# Patient Record
Sex: FEMALE | Race: WHITE | NOT HISPANIC OR LATINO | Employment: FULL TIME | URBAN - METROPOLITAN AREA
[De-identification: names, ages, dates, MRNs, and addresses within clinical notes are randomized per-mention and may not be internally consistent; named-entity substitution may affect disease eponyms.]

---

## 2017-09-23 ENCOUNTER — GENERIC CONVERSION - ENCOUNTER (OUTPATIENT)
Dept: OTHER | Facility: OTHER | Age: 42
End: 2017-09-23

## 2018-01-17 NOTE — PROGRESS NOTES
Assessment    1  Acute sinusitis (461 9) (J01 90)    Plan  Acute sinusitis    · Amoxicillin-Pot Clavulanate 875-125 MG Oral Tablet (Augmentin); TAKE 1 TABLET  TWICE DAILY UNTIL FINISHED    Discussion/Summary    Sinusitis of frontal region  Abx given, start claritin D along with and f/u with PCP in 7 days if not better or worse  Possible side effects of new medications were reviewed with the patient/guardian today  The treatment plan was reviewed with the patient/guardian  The patient/guardian understands and agrees with the treatment plan      Chief Complaint  Sinus infection      History of Present Illness  The patient is being seen for an initial evaluation of sinusitis  The sinusitis involves the frontal sinuses  The patient is currently experiencing symptoms  no facial pain facial pressure headache no retro-orbital pain no dental pain nasal congestion purulent rhinorrhea postnasal drainage   Associated symptoms:  chills and cough, but no fever, no nausea, no ear fullness, no ear pain, no ear pressure, no diplopia, no periorbital redness, no periorbital swelling, no neck stiffness and no sore throat  Review of Systems    Constitutional: as noted in HPI  Gastrointestinal: no complaints of abdominal pain, no constipation, no nausea or diarrhea, no vomiting, no bloody stools  Allergies    1  No Known Drug Allergies    Observations Made  No distress but occ intermittent cough        Signatures   Electronically signed by : BRENDEN Frederick ; Sep 23 2017 10:22AM EST                       (Author)

## 2018-12-11 ENCOUNTER — TRANSCRIBE ORDERS (OUTPATIENT)
Dept: ADMINISTRATIVE | Facility: HOSPITAL | Age: 43
End: 2018-12-11

## 2018-12-11 DIAGNOSIS — Z12.31 VISIT FOR SCREENING MAMMOGRAM: Primary | ICD-10-CM

## 2019-01-14 ENCOUNTER — HOSPITAL ENCOUNTER (OUTPATIENT)
Dept: RADIOLOGY | Age: 44
Discharge: HOME | End: 2019-01-14
Payer: COMMERCIAL

## 2019-01-14 VITALS — WEIGHT: 220 LBS | BODY MASS INDEX: 37.56 KG/M2 | HEIGHT: 64 IN

## 2019-01-14 DIAGNOSIS — Z12.31 VISIT FOR SCREENING MAMMOGRAM: ICD-10-CM

## 2019-01-14 PROCEDURE — 77067 SCR MAMMO BI INCL CAD: CPT

## 2019-06-23 ENCOUNTER — APPOINTMENT (EMERGENCY)
Dept: CT IMAGING | Facility: HOSPITAL | Age: 44
End: 2019-06-23
Payer: COMMERCIAL

## 2019-06-23 ENCOUNTER — HOSPITAL ENCOUNTER (EMERGENCY)
Facility: HOSPITAL | Age: 44
Discharge: HOME | End: 2019-06-23
Attending: EMERGENCY MEDICINE | Admitting: EMERGENCY MEDICINE
Payer: COMMERCIAL

## 2019-06-23 VITALS
TEMPERATURE: 98.7 F | RESPIRATION RATE: 20 BRPM | HEART RATE: 80 BPM | SYSTOLIC BLOOD PRESSURE: 168 MMHG | OXYGEN SATURATION: 98 % | DIASTOLIC BLOOD PRESSURE: 79 MMHG

## 2019-06-23 DIAGNOSIS — H66.90 OTITIS MEDIA: ICD-10-CM

## 2019-06-23 DIAGNOSIS — H70.92 MASTOIDITIS OF LEFT SIDE: ICD-10-CM

## 2019-06-23 DIAGNOSIS — H60.332 ACUTE SWIMMER'S EAR OF LEFT SIDE: Primary | ICD-10-CM

## 2019-06-23 LAB
ANION GAP SERPL CALCULATED.3IONS-SCNC: 9 MMOL/L (ref 4–13)
BASOPHILS # BLD AUTO: 0.04 THOUSANDS/ΜL (ref 0–0.1)
BASOPHILS NFR BLD AUTO: 1 % (ref 0–1)
BUN SERPL-MCNC: 15 MG/DL (ref 5–25)
CALCIUM SERPL-MCNC: 9 MG/DL (ref 8.3–10.1)
CHLORIDE SERPL-SCNC: 102 MMOL/L (ref 100–108)
CO2 SERPL-SCNC: 29 MMOL/L (ref 21–32)
CREAT SERPL-MCNC: 0.75 MG/DL (ref 0.6–1.3)
EOSINOPHIL # BLD AUTO: 0.14 THOUSAND/ΜL (ref 0–0.61)
EOSINOPHIL NFR BLD AUTO: 2 % (ref 0–6)
ERYTHROCYTE [DISTWIDTH] IN BLOOD BY AUTOMATED COUNT: 13.2 % (ref 11.6–15.1)
GFR SERPL CREATININE-BSD FRML MDRD: 97 ML/MIN/1.73SQ M
GLUCOSE SERPL-MCNC: 99 MG/DL (ref 65–140)
HCT VFR BLD AUTO: 43.4 % (ref 34.8–46.1)
HGB BLD-MCNC: 14 G/DL (ref 11.5–15.4)
IMM GRANULOCYTES # BLD AUTO: 0.02 THOUSAND/UL (ref 0–0.2)
IMM GRANULOCYTES NFR BLD AUTO: 0 % (ref 0–2)
LYMPHOCYTES # BLD AUTO: 1.08 THOUSANDS/ΜL (ref 0.6–4.47)
LYMPHOCYTES NFR BLD AUTO: 13 % (ref 14–44)
MCH RBC QN AUTO: 29.5 PG (ref 26.8–34.3)
MCHC RBC AUTO-ENTMCNC: 32.3 G/DL (ref 31.4–37.4)
MCV RBC AUTO: 91 FL (ref 82–98)
MONOCYTES # BLD AUTO: 0.44 THOUSAND/ΜL (ref 0.17–1.22)
MONOCYTES NFR BLD AUTO: 5 % (ref 4–12)
NEUTROPHILS # BLD AUTO: 6.72 THOUSANDS/ΜL (ref 1.85–7.62)
NEUTS SEG NFR BLD AUTO: 79 % (ref 43–75)
NRBC BLD AUTO-RTO: 0 /100 WBCS
PLATELET # BLD AUTO: 293 THOUSANDS/UL (ref 149–390)
PMV BLD AUTO: 9.6 FL (ref 8.9–12.7)
POTASSIUM SERPL-SCNC: 4.4 MMOL/L (ref 3.5–5.3)
RBC # BLD AUTO: 4.75 MILLION/UL (ref 3.81–5.12)
SODIUM SERPL-SCNC: 140 MMOL/L (ref 136–145)
WBC # BLD AUTO: 8.44 THOUSAND/UL (ref 4.31–10.16)

## 2019-06-23 PROCEDURE — 96374 THER/PROPH/DIAG INJ IV PUSH: CPT

## 2019-06-23 PROCEDURE — 36415 COLL VENOUS BLD VENIPUNCTURE: CPT | Performed by: PHYSICIAN ASSISTANT

## 2019-06-23 PROCEDURE — 70480 CT ORBIT/EAR/FOSSA W/O DYE: CPT

## 2019-06-23 PROCEDURE — 99284 EMERGENCY DEPT VISIT MOD MDM: CPT | Performed by: PHYSICIAN ASSISTANT

## 2019-06-23 PROCEDURE — 99242 OFF/OP CONSLTJ NEW/EST SF 20: CPT | Performed by: INTERNAL MEDICINE

## 2019-06-23 PROCEDURE — 85025 COMPLETE CBC W/AUTO DIFF WBC: CPT | Performed by: PHYSICIAN ASSISTANT

## 2019-06-23 PROCEDURE — 99284 EMERGENCY DEPT VISIT MOD MDM: CPT

## 2019-06-23 PROCEDURE — 80048 BASIC METABOLIC PNL TOTAL CA: CPT | Performed by: PHYSICIAN ASSISTANT

## 2019-06-23 PROCEDURE — 96375 TX/PRO/DX INJ NEW DRUG ADDON: CPT

## 2019-06-23 RX ORDER — CIPROFLOXACIN AND DEXAMETHASONE 3; 1 MG/ML; MG/ML
4 SUSPENSION/ DROPS AURICULAR (OTIC) ONCE
Status: COMPLETED | OUTPATIENT
Start: 2019-06-23 | End: 2019-06-23

## 2019-06-23 RX ORDER — CIPROFLOXACIN 250 MG/1
500 TABLET, FILM COATED ORAL 2 TIMES DAILY
Qty: 40 TABLET | Refills: 0 | Status: SHIPPED | OUTPATIENT
Start: 2019-06-23 | End: 2019-07-03

## 2019-06-23 RX ORDER — CIPROFLOXACIN AND DEXAMETHASONE 3; 1 MG/ML; MG/ML
4 SUSPENSION/ DROPS AURICULAR (OTIC) ONCE
Status: DISCONTINUED | OUTPATIENT
Start: 2019-06-23 | End: 2019-06-23

## 2019-06-23 RX ORDER — TRAMADOL HYDROCHLORIDE 50 MG/1
50 TABLET ORAL EVERY 6 HOURS PRN
Qty: 12 TABLET | Refills: 0 | Status: SHIPPED | OUTPATIENT
Start: 2019-06-23 | End: 2019-07-03

## 2019-06-23 RX ORDER — KETOROLAC TROMETHAMINE 30 MG/ML
15 INJECTION, SOLUTION INTRAMUSCULAR; INTRAVENOUS ONCE
Status: COMPLETED | OUTPATIENT
Start: 2019-06-23 | End: 2019-06-23

## 2019-06-23 RX ORDER — MORPHINE SULFATE 4 MG/ML
4 INJECTION, SOLUTION INTRAMUSCULAR; INTRAVENOUS ONCE
Status: COMPLETED | OUTPATIENT
Start: 2019-06-23 | End: 2019-06-23

## 2019-06-23 RX ADMIN — MORPHINE SULFATE 4 MG: 4 INJECTION INTRAVENOUS at 10:34

## 2019-06-23 RX ADMIN — CIPROFLOXACIN AND DEXAMETHASONE 4 DROP: 3; 1 SUSPENSION/ DROPS AURICULAR (OTIC) at 12:40

## 2019-06-23 RX ADMIN — KETOROLAC TROMETHAMINE 15 MG: 30 INJECTION, SOLUTION INTRAMUSCULAR at 10:34

## 2022-05-12 RX ORDER — ACETAMINOPHEN 325 MG/1
650 TABLET ORAL EVERY 6 HOURS PRN
COMMUNITY

## 2022-05-12 RX ORDER — PAROXETINE 10 MG/1
10 TABLET, FILM COATED ORAL DAILY
COMMUNITY

## 2022-05-12 RX ORDER — BUPROPION HYDROCHLORIDE 450 MG/1
450 TABLET, FILM COATED, EXTENDED RELEASE ORAL DAILY
COMMUNITY

## 2022-05-12 RX ORDER — MELOXICAM 15 MG/1
15 TABLET ORAL DAILY
COMMUNITY

## 2022-05-12 RX ORDER — HYDROCHLOROTHIAZIDE 12.5 MG/1
12.5 TABLET ORAL DAILY
COMMUNITY

## 2022-05-12 NOTE — PRE-PROCEDURE INSTRUCTIONS
Pre-Surgery Instructions:   Medication Instructions    acetaminophen (TYLENOL) 325 mg tablet Uses PRN- OK to take day of surgery    Ascorbic Acid (VITAMIN C PO) Stop taking 7 days prior to surgery   B Complex-C (SUPER B COMPLEX PO) Stop taking 7 days prior to surgery   buPROPion (FORFIVO XL) 450 MG 24 hr tablet Take day of surgery   CALCIUM PO Stop taking 7 days prior to surgery   conjugated estrogens (PREMARIN) 0 625 mg tablet Hold day of surgery   hydrochlorothiazide (HYDRODIURIL) 12 5 mg tablet Hold day of surgery   loratadine-pseudoephedrine (CLARITIN-D 12-HOUR) 5-120 mg per tablet Hold day of surgery   meloxicam (MOBIC) 15 mg tablet Stop taking 3 days prior to surgery   metFORMIN (GLUCOPHAGE-XR) 750 mg 24 hr tablet Hold day of surgery   PARoxetine (PAXIL) 10 mg tablet Take day of surgery   Tretinoin 0 05 % LOTN Hold day of surgery   VITAMIN D PO Stop taking 7 days prior to surgery  [facility] preop instructions reviewed with pt  Pt will get antibacterial soap

## 2022-05-15 ENCOUNTER — ANESTHESIA EVENT (OUTPATIENT)
Dept: PERIOP | Facility: HOSPITAL | Age: 47
End: 2022-05-15
Payer: COMMERCIAL

## 2022-05-17 ENCOUNTER — APPOINTMENT (OUTPATIENT)
Dept: RADIOLOGY | Facility: HOSPITAL | Age: 47
End: 2022-05-17
Payer: COMMERCIAL

## 2022-05-17 ENCOUNTER — HOSPITAL ENCOUNTER (OUTPATIENT)
Dept: RADIOLOGY | Facility: HOSPITAL | Age: 47
Setting detail: OUTPATIENT SURGERY
Discharge: HOME | End: 2022-05-17
Payer: COMMERCIAL

## 2022-05-17 ENCOUNTER — HOSPITAL ENCOUNTER (OUTPATIENT)
Facility: HOSPITAL | Age: 47
Setting detail: OUTPATIENT SURGERY
Discharge: HOME | End: 2022-05-17
Attending: PODIATRIST | Admitting: PODIATRIST
Payer: COMMERCIAL

## 2022-05-17 ENCOUNTER — ANESTHESIA (OUTPATIENT)
Dept: PERIOP | Facility: HOSPITAL | Age: 47
End: 2022-05-17
Payer: COMMERCIAL

## 2022-05-17 VITALS
RESPIRATION RATE: 14 BRPM | SYSTOLIC BLOOD PRESSURE: 116 MMHG | DIASTOLIC BLOOD PRESSURE: 75 MMHG | BODY MASS INDEX: 38.99 KG/M2 | TEMPERATURE: 97.1 F | HEART RATE: 72 BPM | WEIGHT: 228.4 LBS | HEIGHT: 64 IN | OXYGEN SATURATION: 95 %

## 2022-05-17 DIAGNOSIS — Z98.890 POST-OPERATIVE STATE: Primary | ICD-10-CM

## 2022-05-17 DIAGNOSIS — M20.11 ACQUIRED HALLUX VALGUS OF RIGHT FOOT: ICD-10-CM

## 2022-05-17 PROBLEM — I10 HTN (HYPERTENSION): Status: ACTIVE | Noted: 2022-05-17

## 2022-05-17 PROBLEM — E66.812 OBESITY, CLASS II, BMI 35-39.9: Status: ACTIVE | Noted: 2022-05-17

## 2022-05-17 PROBLEM — E66.9 OBESITY, CLASS II, BMI 35-39.9: Status: ACTIVE | Noted: 2022-05-17

## 2022-05-17 PROBLEM — E11.9 DIABETES MELLITUS, TYPE 2 (HCC): Status: ACTIVE | Noted: 2022-05-17

## 2022-05-17 LAB — GLUCOSE SERPL-MCNC: 86 MG/DL (ref 65–140)

## 2022-05-17 PROCEDURE — 73620 X-RAY EXAM OF FOOT: CPT

## 2022-05-17 PROCEDURE — C1713 ANCHOR/SCREW BN/BN,TIS/BN: HCPCS | Performed by: PODIATRIST

## 2022-05-17 PROCEDURE — 73630 X-RAY EXAM OF FOOT: CPT

## 2022-05-17 PROCEDURE — 82948 REAGENT STRIP/BLOOD GLUCOSE: CPT

## 2022-05-17 DEVICE — TRIPLANAR DEFORMITY CORRECTION
Type: IMPLANTABLE DEVICE | Site: FOOT | Status: FUNCTIONAL
Brand: CONTROL 360

## 2022-05-17 DEVICE — LOCKING SCREWS
Type: IMPLANTABLE DEVICE | Site: FOOT | Status: FUNCTIONAL
Brand: FASTPITCH 2.7MM HIGH PITCH LOCKING SCREW

## 2022-05-17 DEVICE — IMPLANTABLE DEVICE: Type: IMPLANTABLE DEVICE | Site: FOOT | Status: FUNCTIONAL

## 2022-05-17 RX ORDER — OXYCODONE HYDROCHLORIDE AND ACETAMINOPHEN 5; 325 MG/1; MG/1
1 TABLET ORAL EVERY 6 HOURS PRN
Qty: 20 TABLET | Refills: 0 | Status: SHIPPED | OUTPATIENT
Start: 2022-05-17

## 2022-05-17 RX ORDER — MEPERIDINE HYDROCHLORIDE 25 MG/ML
12.5 INJECTION INTRAMUSCULAR; INTRAVENOUS; SUBCUTANEOUS
Status: DISCONTINUED | OUTPATIENT
Start: 2022-05-17 | End: 2022-05-17 | Stop reason: HOSPADM

## 2022-05-17 RX ORDER — MIDAZOLAM HYDROCHLORIDE 2 MG/2ML
INJECTION, SOLUTION INTRAMUSCULAR; INTRAVENOUS AS NEEDED
Status: DISCONTINUED | OUTPATIENT
Start: 2022-05-17 | End: 2022-05-17

## 2022-05-17 RX ORDER — FENTANYL CITRATE/PF 50 MCG/ML
25 SYRINGE (ML) INJECTION
Status: DISCONTINUED | OUTPATIENT
Start: 2022-05-17 | End: 2022-05-17 | Stop reason: HOSPADM

## 2022-05-17 RX ORDER — ONDANSETRON 2 MG/ML
4 INJECTION INTRAMUSCULAR; INTRAVENOUS ONCE AS NEEDED
Status: DISCONTINUED | OUTPATIENT
Start: 2022-05-17 | End: 2022-05-17 | Stop reason: HOSPADM

## 2022-05-17 RX ORDER — CEFAZOLIN SODIUM 2 G/50ML
2000 SOLUTION INTRAVENOUS EVERY 8 HOURS
Status: DISCONTINUED | OUTPATIENT
Start: 2022-05-17 | End: 2022-05-17 | Stop reason: HOSPADM

## 2022-05-17 RX ORDER — OXYCODONE HYDROCHLORIDE 5 MG/1
10 TABLET ORAL EVERY 4 HOURS PRN
Status: DISCONTINUED | OUTPATIENT
Start: 2022-05-17 | End: 2022-05-17 | Stop reason: HOSPADM

## 2022-05-17 RX ORDER — HYDROMORPHONE HCL/PF 1 MG/ML
0.5 SYRINGE (ML) INJECTION
Status: DISCONTINUED | OUTPATIENT
Start: 2022-05-17 | End: 2022-05-17 | Stop reason: HOSPADM

## 2022-05-17 RX ORDER — ONDANSETRON 2 MG/ML
INJECTION INTRAMUSCULAR; INTRAVENOUS AS NEEDED
Status: DISCONTINUED | OUTPATIENT
Start: 2022-05-17 | End: 2022-05-17

## 2022-05-17 RX ORDER — LIDOCAINE HYDROCHLORIDE 20 MG/ML
INJECTION, SOLUTION EPIDURAL; INFILTRATION; INTRACAUDAL; PERINEURAL AS NEEDED
Status: DISCONTINUED | OUTPATIENT
Start: 2022-05-17 | End: 2022-05-17

## 2022-05-17 RX ORDER — SODIUM CHLORIDE, SODIUM LACTATE, POTASSIUM CHLORIDE, CALCIUM CHLORIDE 600; 310; 30; 20 MG/100ML; MG/100ML; MG/100ML; MG/100ML
125 INJECTION, SOLUTION INTRAVENOUS CONTINUOUS
Status: DISCONTINUED | OUTPATIENT
Start: 2022-05-17 | End: 2022-05-17 | Stop reason: HOSPADM

## 2022-05-17 RX ORDER — KETOROLAC TROMETHAMINE 30 MG/ML
INJECTION, SOLUTION INTRAMUSCULAR; INTRAVENOUS AS NEEDED
Status: DISCONTINUED | OUTPATIENT
Start: 2022-05-17 | End: 2022-05-17

## 2022-05-17 RX ORDER — FENTANYL CITRATE 50 UG/ML
INJECTION, SOLUTION INTRAMUSCULAR; INTRAVENOUS AS NEEDED
Status: DISCONTINUED | OUTPATIENT
Start: 2022-05-17 | End: 2022-05-17

## 2022-05-17 RX ORDER — PROPOFOL 10 MG/ML
INJECTION, EMULSION INTRAVENOUS AS NEEDED
Status: DISCONTINUED | OUTPATIENT
Start: 2022-05-17 | End: 2022-05-17

## 2022-05-17 RX ORDER — ACETAMINOPHEN 325 MG/1
650 TABLET ORAL EVERY 6 HOURS PRN
Status: DISCONTINUED | OUTPATIENT
Start: 2022-05-17 | End: 2022-05-17 | Stop reason: HOSPADM

## 2022-05-17 RX ORDER — HYDROMORPHONE HCL/PF 1 MG/ML
SYRINGE (ML) INJECTION AS NEEDED
Status: DISCONTINUED | OUTPATIENT
Start: 2022-05-17 | End: 2022-05-17

## 2022-05-17 RX ADMIN — LIDOCAINE HYDROCHLORIDE 100 MG: 20 INJECTION, SOLUTION EPIDURAL; INFILTRATION; INTRACAUDAL; PERINEURAL at 13:37

## 2022-05-17 RX ADMIN — FENTANYL CITRATE 25 MCG: 50 INJECTION, SOLUTION INTRAMUSCULAR; INTRAVENOUS at 16:25

## 2022-05-17 RX ADMIN — CEFAZOLIN SODIUM 2000 MG: 2 SOLUTION INTRAVENOUS at 13:37

## 2022-05-17 RX ADMIN — MIDAZOLAM 2 MG: 1 INJECTION INTRAMUSCULAR; INTRAVENOUS at 13:33

## 2022-05-17 RX ADMIN — KETOROLAC TROMETHAMINE 30 MG: 30 INJECTION, SOLUTION INTRAMUSCULAR at 15:27

## 2022-05-17 RX ADMIN — HYDROMORPHONE HYDROCHLORIDE 0.5 MG: 1 INJECTION, SOLUTION INTRAMUSCULAR; INTRAVENOUS; SUBCUTANEOUS at 16:33

## 2022-05-17 RX ADMIN — ONDANSETRON 4 MG: 2 INJECTION INTRAMUSCULAR; INTRAVENOUS at 15:27

## 2022-05-17 RX ADMIN — SODIUM CHLORIDE, SODIUM LACTATE, POTASSIUM CHLORIDE, AND CALCIUM CHLORIDE 125 ML/HR: .6; .31; .03; .02 INJECTION, SOLUTION INTRAVENOUS at 12:18

## 2022-05-17 RX ADMIN — FENTANYL CITRATE 25 MCG: 50 INJECTION, SOLUTION INTRAMUSCULAR; INTRAVENOUS at 16:13

## 2022-05-17 RX ADMIN — FENTANYL CITRATE 100 MCG: 50 INJECTION INTRAMUSCULAR; INTRAVENOUS at 13:37

## 2022-05-17 RX ADMIN — SODIUM CHLORIDE, SODIUM LACTATE, POTASSIUM CHLORIDE, AND CALCIUM CHLORIDE 125 ML/HR: .6; .31; .03; .02 INJECTION, SOLUTION INTRAVENOUS at 16:43

## 2022-05-17 RX ADMIN — OXYCODONE HYDROCHLORIDE 5 MG: 5 TABLET ORAL at 18:02

## 2022-05-17 RX ADMIN — PROPOFOL 200 MG: 10 INJECTION, EMULSION INTRAVENOUS at 13:37

## 2022-05-17 RX ADMIN — HYDROMORPHONE HYDROCHLORIDE 0.5 MG: 1 INJECTION, SOLUTION INTRAMUSCULAR; INTRAVENOUS; SUBCUTANEOUS at 16:03

## 2022-05-17 NOTE — DISCHARGE SUMMARY
Discharge Summary Outpatient Procedure Podiatry -   Sisto Chain 49 y o  female MRN: [id number]  Unit/Bed#: OR JOE Encounter: [id number]    Admission Date: 5/17/2022     Admitting Diagnosis: Acquired hallux valgus of right foot [M20 11]    Discharge Diagnosis: same    Procedures Performed: 1st metatarsal cuneiform joint fusion-Lapiplasty procedure, modified Knox bunionectomy and second digit arthrodesis at proximal interphalangeal joint with capsular release at 2nd metatarsal phalangeal joint: 73416 (CPT®)    Complications: none    Condition at Discharge: stable    Discharge instructions/Information to patient and family:   See after visit summary for information provided to patient and family  Provisions for Follow-Up Care/Important appointments:  See after visit summary for information related to follow-up care and any pertinent home health orders  Discharge Medications:  See after visit summary for reconciled discharge medications provided to patient and family

## 2022-05-17 NOTE — ANESTHESIA PREPROCEDURE EVALUATION
Procedure:  1st metatarsal cuneiform joint fusion-Lapiplasty procedure, modified Knox bunionectomy and second digit arthrodesis at proximal interphalangeal joint with possible capsular release at 2nd metatarsal phalangeal joint (Right Foot)    Relevant Problems   CARDIO   (+) HTN (hypertension)      ENDO   (+) Diabetes mellitus, type 2 (HCC)      Other   (+) Obesity, Class II, BMI 35-39 9        Physical Exam    Airway    Mallampati score: III  TM Distance: >3 FB  Neck ROM: full     Dental   No notable dental hx     Cardiovascular  Rhythm: regular, Rate: normal, Cardiovascular exam normal    Pulmonary  Pulmonary exam normal Breath sounds clear to auscultation,     Other Findings        Anesthesia Plan  ASA Score- 2     Anesthesia Type- general with ASA Monitors  Additional Monitors:   Airway Plan: LMA  Plan Factors-    Chart reviewed  Patient summary reviewed  Patient is not a current smoker  Patient did not smoke on day of surgery  Induction- intravenous  Postoperative Plan-     Informed Consent- Anesthetic plan and risks discussed with patient

## 2022-05-17 NOTE — ANESTHESIA POSTPROCEDURE EVALUATION
Post-Op Assessment Note    CV Status:  Stable    Pain management: adequate     Mental Status:  Awake   Hydration Status:  Stable   PONV Controlled:  None   Airway Patency:  Patent      Post Op Vitals Reviewed: Yes      Staff: Anesthesiologist         No complications documented      BP      Temp      Pulse     Resp      SpO2      /66   Pulse 88   Temp 98 2 °F (36 8 °C) (Temporal)   Resp 19   Ht 5' 4" (1 626 m)   Wt 104 kg (228 lb 6 3 oz)   SpO2 97%   BMI 39 20 kg/m²

## 2022-05-17 NOTE — DISCHARGE INSTRUCTIONS
[facility]  Dr Laura Baxter Instructions    1  Take your prescribed medication as directed  2  Upon arrival at home, lie down and elevate your surgical foot on 2 pillows  3  Remain quiet, off your feet as much as possible, for the first 24-48 hours  This is when your feet first swell and may become painful  After 48 hours you may begin limited walking following these restrictions:   Nonweightbearinbg to surgical foot in splint with crutches    4  Drink large quantities of water  Consume no alcohol  Continue a well-balanced diet  5  Report any unusual discomfort or fever to this office  6  A limited amount of discomfort and swelling is to be expected  In some cases the skin may take on a bruised appearance  The surgical solution that was applied to your foot prior to the operation is dark in color and the operation site may appear to be oozing when it actually is not  7  A slight amount of blood is to be expected, and is no cause for alarm  Do not remove the dressings  If there is active bleeding and if the bleeding persists, add additional gauze to the bandage, apply direct pressure, elevate your feet and call this office  8  Do not get the dressings wet  As regular bathing may be inconvenient, sponge baths are recommended  9  When anesthesia wears off and if any discomfort should be present, apply an ice pack directly over the operated area for 15 minute intervals for several hours or until the pain leaves  (USE IN EXCESS OF 15 MINUTES COULD CAUSE FROSTBITE)  Do not use hot water bags or electric pads  A convenient icepack can be made by placing ice cubes in a plastic bag and covering this with a towel  10  If necessary, take a mild laxative before retiring  11  Wear your special open shoes anytime you put weight on your foot, even if it is just to walk to the bathroom and back  It will probably be 2 or 3 weeks before you will be permitted to try regular shoes    12  Having performed the operation, we are interested in a prompt recovery  Please cooperate by following the above instructions  13  Please call to confirm your post-op appointment or call with any other questions

## 2022-05-17 NOTE — OP NOTE
OPERATIVE REPORT - Podiatry  PATIENT NAME: Connie Urena    :  1975  MRN: [id number]  Pt Location: [facility] OR ROOM 05    SURGERY DATE: 2022    Surgeon(s) and Role:     * Too Vela DPM - Primary     * Hollie Dee DPM - Assisting    Preop Diagnosis:  Acquired hallux valgus of right foot [M20 11]  Hammertoe right 2nd digit      Post-Op Diagnosis Codes:     * Acquired hallux valgus of right foot [M20 11]  Hammertoe right 2nd digit    Procedure(s) (LRB):  1st metatarsal cuneiform joint fusion-Lapiplasty procedure, modified Knox bunionectomy and second digit arthrodesis at proximal interphalangeal joint with capsular release at 2nd metatarsal phalangeal joint (Right)      Specimen(s):  * No specimens in log *    Estimated Blood Loss:   Minimal    Drains:  * No LDAs found *    Implants:  Implant Name Type Inv  Item Serial No   Lot No  LRB No  Used Action   IMPLANT ANATOMIC BIPLANAR LAPIPLASTY SYS 1 - [id number]  IMPLANT ANATOMIC BIPLANAR LAPIPLASTY SYS 1  [employer] [id number] Right 1 Implanted   SCREW 2 7MM LCK HIGH PITCH FASTPITCH - [id number]  SCREW 2 7MM LCK HIGH PITCH PÉREZ [facility]  [employer] [id number] Right 1 Implanted   dynanite pip implant 12mm with instrumentation    [employer] [id number] Right 1 Implanted       Anesthesia Type:   General/LMA with 20 ml of 1% Lidocaine and 0 5% Bupivacaine in a 1:1 mixture    Hemostasis:  Surgical dissection  Pneumatic calf tourniquet placed for 118 minutes    Materials:  As seen in implants plus vicryl, nylon and monocril    Operative Findings:  1st Metatarsal with cartilage fully intact  Excellent position noted of the hardware  Restoration of metatarsal parabole    Complications:   None    Procedure and Technique:     Under mild sedation, the patient was brought into the operating room and placed on the operating room table in the supine position   A pneumatic tourniquet was then placed around the patient's right lower extremity with ample webril padding  A time out was performed to confirm the correct patient, procedure and site with all parties in agreement  Following IV sedation, a field block was performed consisting of 20 ml of 1% Lidocaine and 0 5% Bupivacaine in a 1:1 mixture  The foot was then scrubbed, prepped and draped in the usual aseptic manner  An esmarch bandage was utilized to exsangunate the patients foot and the tourniquet was then inflated  The esmarch bandage was removed and the foot was placed on the operating room table  Attention was directed to the foot where a 7cm linear incision was made over the first metatarsophalangeal joint extending to the 1st met cuneiform joint  Dissection was continued through the subq layer, bleeders were cauterized as needed  The first MTPJ was incised and via sharp means the metatarsal head was then exposed  By use of sagittal saw the medial eminence was resected  The dorsal exostosis over the met head was also resected with sagittal saw  A lateral release was performed via same incision incision in interspace  Attention was then redirected to the first TMJ where a deep incision was carried down to bone  The periosteum was longitudinally incised and reflected away medially and laterally from the underlying first tarsometatarsal joint  A 1st tarsometatarsal fusion was performed under 's protocol with LAVEGO Lapiplasty 3D bunion correction system  A joint release was performed running sagittal saw closely down 1st TMT to mobilize and plane the joint surfaces  The positioner was then applied to allow for IM angle correction as well as frontal plane rotation and sagittal alignment in correct position  The Lapiplasty cut guide was then secured and utilized to make precise joint cuts with triplanar correction held in place  The distractor was then applied to distract the joint for removal of bone slices and fenestration of joint surfaces    Utilizing compressor, the cut joint surfaces were brought together for controlled apposition and compression of the arthrodesis site  Biplanar plates were then applied dorsally and medially to provide multiplane fixation for rapid weight-bearing  The site was flushed with sterile saline  It was noted that the intermetatarsal angle was reduced within normal limits and the hallux sat in a normal anatomical position with good range of motion of the first MPJ  Final position was confirmed with C-arm fluoroscopy  Attention was then directed to the right 2nd toe  A hammertoe deformity was present  A  dorsal linear incision was made from the metatarsal- phalangeal joint to the proximal interphalangeal joint  The incision was then deepened via sharp dissection through the subcutaneous tissues, ligating all venous vessels as necessary  Dissection was carried to the level of the EDL tendon  The tendon was then transected at that level  The PIPJ was then exposed and the medial and lateral collateral ligaments were incised to expose the head of the proximal phalanx  By use of sagittal saw, the head of the proximal phalanx was resected  Push test showed that there was still a dorsal flexion contracture was still present at the metatarsal-phalangeal joint  A capsulotomy was then performed at the metatarsalphalangeal joint percutaneously  A push test now showed a more normal rectus position of the digit  A sagittal saw was used to remove the articular cartilage off of the base of the middle phalange and the wound was then flushed with copious amounts of normal saline solution  At this time, an Arthrex PIP implant was used to maintain corrected positition of the hammertoe, inserting the the implant in the proximal and middle phalanx  The wound was then flushed with copious amounts of normal sterile solution  The extensor tendon was then reapproximated and maintained a lengthened position with 3-0 Vicryl    Skin edges were reapproximated with 4-0 Nylon in a horizontal mattress suture technique  The surgical incision was irrigated with copious amounts of normal sterile saline  The periosteal and capsular structures were reapproximated using 2-0 Vicryl  Subcutaneous closure was obtained utilizing 3-0 Vicryl in an interrupted suture technique  Skin edges were reapproximated and closure was obtained utilizing interrupted retention sutures utilizing 4-0  Monocryl  The foot was then cleansed and dried  The incision site was dressed with Adaptic and steri strips, 4x4 gauze, and ABD  This was then covered with a Kerlix and an ACE wrap  The tourniquet was deflated and normal hyperemic flush was noted to all digits  The patient tolerated the procedure and anesthesia well and was transported to the PACU with vital signs stable  Dr Yeyo Pitts was present during the entire procedure and participated in all key aspects  SIGNATURE: Sidney Villanueva DPM  DATE: May 17, 2022  TIME: 4:21 PM      Portions of the record may have been created with voice recognition software  Occasional wrong word or "sound a like" substitutions may have occurred due to the inherent limitations of voice recognition software  Read the chart carefully and recognize, using context, where substitutions have occurred

## 2023-06-02 ENCOUNTER — OFFICE VISIT (OUTPATIENT)
Dept: INTERNAL MEDICINE CLINIC | Facility: CLINIC | Age: 48
End: 2023-06-02
Payer: COMMERCIAL

## 2023-06-02 VITALS
BODY MASS INDEX: 36.57 KG/M2 | WEIGHT: 214.2 LBS | DIASTOLIC BLOOD PRESSURE: 94 MMHG | OXYGEN SATURATION: 97 % | HEART RATE: 82 BPM | SYSTOLIC BLOOD PRESSURE: 122 MMHG | HEIGHT: 64 IN

## 2023-06-02 DIAGNOSIS — E11.9 TYPE II DIABETES MELLITUS, WELL CONTROLLED (HCC): ICD-10-CM

## 2023-06-02 DIAGNOSIS — Z87.42 HISTORY OF PCOS: ICD-10-CM

## 2023-06-02 DIAGNOSIS — Z12.4 SCREENING FOR CERVICAL CANCER: ICD-10-CM

## 2023-06-02 DIAGNOSIS — Z12.31 ENCOUNTER FOR SCREENING MAMMOGRAM FOR BREAST CANCER: ICD-10-CM

## 2023-06-02 DIAGNOSIS — Z11.59 NEED FOR HEPATITIS C SCREENING TEST: ICD-10-CM

## 2023-06-02 DIAGNOSIS — I10 ESSENTIAL HYPERTENSION: ICD-10-CM

## 2023-06-02 DIAGNOSIS — Z12.11 ENCOUNTER FOR COLORECTAL CANCER SCREENING: ICD-10-CM

## 2023-06-02 DIAGNOSIS — Z00.00 ANNUAL PHYSICAL EXAM: Primary | ICD-10-CM

## 2023-06-02 DIAGNOSIS — Z23 ENCOUNTER FOR IMMUNIZATION: ICD-10-CM

## 2023-06-02 DIAGNOSIS — F90.9 ATTENTION DEFICIT HYPERACTIVITY DISORDER (ADHD), UNSPECIFIED ADHD TYPE: ICD-10-CM

## 2023-06-02 DIAGNOSIS — Z71.89 COUNSELING FOR ESTROGEN REPLACEMENT THERAPY: ICD-10-CM

## 2023-06-02 DIAGNOSIS — Z12.12 ENCOUNTER FOR COLORECTAL CANCER SCREENING: ICD-10-CM

## 2023-06-02 PROBLEM — F32.A DEPRESSIVE DISORDER: Status: ACTIVE | Noted: 2017-10-17

## 2023-06-02 PROBLEM — E66.9 OBESITY, CLASS II, BMI 35-39.9: Status: RESOLVED | Noted: 2022-05-17 | Resolved: 2023-06-02

## 2023-06-02 PROBLEM — F41.1 GAD (GENERALIZED ANXIETY DISORDER): Status: ACTIVE | Noted: 2021-05-27

## 2023-06-02 PROBLEM — Z90.710 S/P HYSTERECTOMY: Status: ACTIVE | Noted: 2023-06-02

## 2023-06-02 PROBLEM — E66.812 OBESITY, CLASS II, BMI 35-39.9: Status: RESOLVED | Noted: 2022-05-17 | Resolved: 2023-06-02

## 2023-06-02 LAB
LEFT EYE DIABETIC RETINOPATHY: NORMAL
LEFT EYE IMAGE QUALITY: NORMAL
LEFT EYE MACULAR EDEMA: NORMAL
LEFT EYE OTHER RETINOPATHY: NORMAL
RIGHT EYE DIABETIC RETINOPATHY: NORMAL
RIGHT EYE IMAGE QUALITY: NORMAL
RIGHT EYE MACULAR EDEMA: NORMAL
RIGHT EYE OTHER RETINOPATHY: NORMAL
SEVERITY (EYE EXAM): NORMAL
SL AMB POCT HEMOGLOBIN AIC: 5.1 (ref ?–6.5)

## 2023-06-02 PROCEDURE — 99386 PREV VISIT NEW AGE 40-64: CPT | Performed by: INTERNAL MEDICINE

## 2023-06-02 PROCEDURE — 83036 HEMOGLOBIN GLYCOSYLATED A1C: CPT | Performed by: INTERNAL MEDICINE

## 2023-06-02 PROCEDURE — 92250 FUNDUS PHOTOGRAPHY W/I&R: CPT | Performed by: INTERNAL MEDICINE

## 2023-06-02 RX ORDER — FLUTICASONE PROPIONATE 50 MCG
2 SPRAY, SUSPENSION (ML) NASAL DAILY
COMMUNITY
Start: 2023-01-14

## 2023-06-02 RX ORDER — METHYLPHENIDATE HYDROCHLORIDE 18 MG/1
18 TABLET ORAL DAILY
COMMUNITY
Start: 2023-05-08

## 2023-06-02 RX ORDER — METHYLPHENIDATE HYDROCHLORIDE 36 MG/1
36 TABLET ORAL DAILY
COMMUNITY
Start: 2023-05-09

## 2023-06-02 NOTE — PATIENT INSTRUCTIONS
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  -Blood work has been ordered  -Referral has been made for colonoscopy  -Mammogram order has been generated    Wellness Visit for Adults   AMBULATORY CARE:   A wellness visit  is when you see your healthcare provider to get screened for health problems  Your healthcare provider will also give you advice on how to stay healthy  Write down your questions so you remember to ask them  Ask your healthcare provider how often you should have a wellness visit  What happens at a wellness visit:  Your healthcare provider will ask about your health, and your family history of health problems  This includes high blood pressure, heart disease, and cancer  He or she will ask if you have symptoms that concern you, if you smoke, and about your mood  You may also be asked about your intake of medicines, supplements, food, and alcohol  Any of the following may be done:  • Your weight  will be checked  Your height may also be checked so your body mass index (BMI) can be calculated  Your BMI shows if you are at a healthy weight  • Your blood pressure  and heart rate will be checked  Your temperature may also be checked  • Blood and urine tests  may be done  Blood tests may be done to check your cholesterol levels  Abnormal cholesterol levels increase your risk for heart disease and stroke  You may also need a blood or urine test to check for diabetes if you are at increased risk  Urine tests may be done to look for signs of an infection or kidney disease  • A physical exam  includes checking your heartbeat and lungs with a stethoscope  Your healthcare provider may also check your skin to look for sun damage  • Screening tests  may be recommended  A screening test is done to check for diseases that may not cause symptoms  The screening tests you may need depend on your age, gender, family history, and lifestyle habits   For example, colorectal screening may be recommended if you are 48years old or older  Screening tests you need if you are a woman:   • A Pap smear  is used to screen for cervical cancer  Pap smears are usually done every 3 to 5 years depending on your age  You may need them more often if you have had abnormal Pap smear test results in the past  Ask your healthcare provider how often you should have a Pap smear  • A mammogram  is an x-ray of your breasts to screen for breast cancer  Experts recommend mammograms every 2 years starting at age 48 years  You may need a mammogram at age 52 years or younger if you have an increased risk for breast cancer  Talk to your healthcare provider about when you should start having mammograms and how often you need them  Vaccines you may need:   • Get an influenza vaccine  every year  The influenza vaccine protects you from the flu  Several types of viruses cause the flu  The viruses change over time, so new vaccines are made each year  • Get a tetanus-diphtheria (Td) booster vaccine  every 10 years  This vaccine protects you against tetanus and diphtheria  Tetanus is a severe infection that may cause painful muscle spasms and lockjaw  Diphtheria is a severe bacterial infection that causes a thick covering in the back of your mouth and throat  • Get a human papillomavirus (HPV) vaccine  if you are female and aged 23 to 32 or male 23 to 24 and never received it  This vaccine protects you from HPV infection  HPV is the most common infection spread by sexual contact  HPV may also cause vaginal, penile, and anal cancers  • Get a pneumococcal vaccine  if you are aged 72 years or older  The pneumococcal vaccine is an injection given to protect you from pneumococcal disease  Pneumococcal disease is an infection caused by pneumococcal bacteria  The infection may cause pneumonia, meningitis, or an ear infection  • Get a shingles vaccine  if you are 60 or older, even if you have had shingles before   The shingles vaccine is an injection to protect you from the varicella-zoster virus  This is the same virus that causes chickenpox  Shingles is a painful rash that develops in people who had chickenpox or have been exposed to the virus  How to eat healthy:  My Plate is a model for planning healthy meals  It shows the types and amounts of foods that should go on your plate  Fruits and vegetables make up about half of your plate, and grains and protein make up the other half  A serving of dairy is included on the side of your plate  The amount of calories and serving sizes you need depends on your age, gender, weight, and height  Examples of healthy foods are listed below:  • Eat a variety of vegetables  such as dark green, red, and orange vegetables  You can also include canned vegetables low in sodium (salt) and frozen vegetables without added butter or sauces  • Eat a variety of fresh fruits , canned fruit in 100% juice, frozen fruit, and dried fruit  • Include whole grains  At least half of the grains you eat should be whole grains  Examples include whole-wheat bread, wheat pasta, brown rice, and whole-grain cereals such as oatmeal     • Eat a variety of protein foods such as seafood (fish and shellfish), lean meat, and poultry without skin (turkey and chicken)  Examples of lean meats include pork leg, shoulder, or tenderloin, and beef round, sirloin, tenderloin, and extra lean ground beef  Other protein foods include eggs and egg substitutes, beans, peas, soy products, nuts, and seeds  • Choose low-fat dairy products such as skim or 1% milk or low-fat yogurt, cheese, and cottage cheese  • Limit unhealthy fats  such as butter, hard margarine, and shortening  Exercise:  Exercise at least 30 minutes per day on most days of the week  Some examples of exercise include walking, biking, dancing, and swimming   You can also fit in more physical activity by taking the stairs instead of the elevator or parking farther away from stores  Include muscle strengthening activities 2 days each week  Regular exercise provides many health benefits  It helps you manage your weight, and decreases your risk for type 2 diabetes, heart disease, stroke, and high blood pressure  Exercise can also help improve your mood  Ask your healthcare provider about the best exercise plan for you  General health and safety guidelines:   • Do not smoke  Nicotine and other chemicals in cigarettes and cigars can cause lung damage  Ask your healthcare provider for information if you currently smoke and need help to quit  E-cigarettes or smokeless tobacco still contain nicotine  Talk to your healthcare provider before you use these products  • Limit alcohol  A drink of alcohol is 12 ounces of beer, 5 ounces of wine, or 1½ ounces of liquor  • Lose weight, if needed  Being overweight increases your risk of certain health conditions  These include heart disease, high blood pressure, type 2 diabetes, and certain types of cancer  • Protect your skin  Do not sunbathe or use tanning beds  Use sunscreen with a SPF 15 or higher  Apply sunscreen at least 15 minutes before you go outside  Reapply sunscreen every 2 hours  Wear protective clothing, hats, and sunglasses when you are outside  • Drive safely  Always wear your seatbelt  Make sure everyone in your car wears a seatbelt  A seatbelt can save your life if you are in an accident  Do not use your cell phone when you are driving  This could distract you and cause an accident  Pull over if you need to make a call or send a text message  • Practice safe sex  Use latex condoms if are sexually active and have more than one partner  Your healthcare provider may recommend screening tests for sexually transmitted infections (STIs)  • Wear helmets, lifejackets, and protective gear  Always wear a helmet when you ride a bike or motorcycle, go skiing, or play sports that could cause a head injury   Wear protective equipment when you play sports  Wear a lifejacket when you are on a boat or doing water sports  © Copyright Lacey Rosenthal 2022 Information is for End User's use only and may not be sold, redistributed or otherwise used for commercial purposes  The above information is an  only  It is not intended as medical advice for individual conditions or treatments  Talk to your doctor, nurse or pharmacist before following any medical regimen to see if it is safe and effective for you  Wellness Visit for Adults   AMBULATORY CARE:   A wellness visit  is when you see your healthcare provider to get screened for health problems  Your healthcare provider will also give you advice on how to stay healthy  Write down your questions so you remember to ask them  Ask your healthcare provider how often you should have a wellness visit  What happens at a wellness visit:  Your healthcare provider will ask about your health, and your family history of health problems  This includes high blood pressure, heart disease, and cancer  He or she will ask if you have symptoms that concern you, if you smoke, and about your mood  You may also be asked about your intake of medicines, supplements, food, and alcohol  Any of the following may be done:  • Your weight  will be checked  Your height may also be checked so your body mass index (BMI) can be calculated  Your BMI shows if you are at a healthy weight  • Your blood pressure  and heart rate will be checked  Your temperature may also be checked  • Blood and urine tests  may be done  Blood tests may be done to check your cholesterol levels  Abnormal cholesterol levels increase your risk for heart disease and stroke  You may also need a blood or urine test to check for diabetes if you are at increased risk  Urine tests may be done to look for signs of an infection or kidney disease  • A physical exam  includes checking your heartbeat and lungs with a stethoscope  Your healthcare provider may also check your skin to look for sun damage  • Screening tests  may be recommended  A screening test is done to check for diseases that may not cause symptoms  The screening tests you may need depend on your age, gender, family history, and lifestyle habits  For example, colorectal screening may be recommended if you are 48years old or older  Screening tests you need if you are a woman:   • A Pap smear  is used to screen for cervical cancer  Pap smears are usually done every 3 to 5 years depending on your age  You may need them more often if you have had abnormal Pap smear test results in the past  Ask your healthcare provider how often you should have a Pap smear  • A mammogram  is an x-ray of your breasts to screen for breast cancer  Experts recommend mammograms every 2 years starting at age 48 years  You may need a mammogram at age 52 years or younger if you have an increased risk for breast cancer  Talk to your healthcare provider about when you should start having mammograms and how often you need them  Vaccines you may need:   • Get an influenza vaccine  every year  The influenza vaccine protects you from the flu  Several types of viruses cause the flu  The viruses change over time, so new vaccines are made each year  • Get a tetanus-diphtheria (Td) booster vaccine  every 10 years  This vaccine protects you against tetanus and diphtheria  Tetanus is a severe infection that may cause painful muscle spasms and lockjaw  Diphtheria is a severe bacterial infection that causes a thick covering in the back of your mouth and throat  • Get a human papillomavirus (HPV) vaccine  if you are female and aged 23 to 32 or male 23 to 24 and never received it  This vaccine protects you from HPV infection  HPV is the most common infection spread by sexual contact  HPV may also cause vaginal, penile, and anal cancers      • Get a pneumococcal vaccine  if you are aged 72 years or older  The pneumococcal vaccine is an injection given to protect you from pneumococcal disease  Pneumococcal disease is an infection caused by pneumococcal bacteria  The infection may cause pneumonia, meningitis, or an ear infection  • Get a shingles vaccine  if you are 60 or older, even if you have had shingles before  The shingles vaccine is an injection to protect you from the varicella-zoster virus  This is the same virus that causes chickenpox  Shingles is a painful rash that develops in people who had chickenpox or have been exposed to the virus  How to eat healthy:  My Plate is a model for planning healthy meals  It shows the types and amounts of foods that should go on your plate  Fruits and vegetables make up about half of your plate, and grains and protein make up the other half  A serving of dairy is included on the side of your plate  The amount of calories and serving sizes you need depends on your age, gender, weight, and height  Examples of healthy foods are listed below:  • Eat a variety of vegetables  such as dark green, red, and orange vegetables  You can also include canned vegetables low in sodium (salt) and frozen vegetables without added butter or sauces  • Eat a variety of fresh fruits , canned fruit in 100% juice, frozen fruit, and dried fruit  • Include whole grains  At least half of the grains you eat should be whole grains  Examples include whole-wheat bread, wheat pasta, brown rice, and whole-grain cereals such as oatmeal     • Eat a variety of protein foods such as seafood (fish and shellfish), lean meat, and poultry without skin (turkey and chicken)  Examples of lean meats include pork leg, shoulder, or tenderloin, and beef round, sirloin, tenderloin, and extra lean ground beef  Other protein foods include eggs and egg substitutes, beans, peas, soy products, nuts, and seeds      • Choose low-fat dairy products such as skim or 1% milk or low-fat yogurt, cheese, and cottage cheese  • Limit unhealthy fats  such as butter, hard margarine, and shortening  Exercise:  Exercise at least 30 minutes per day on most days of the week  Some examples of exercise include walking, biking, dancing, and swimming  You can also fit in more physical activity by taking the stairs instead of the elevator or parking farther away from stores  Include muscle strengthening activities 2 days each week  Regular exercise provides many health benefits  It helps you manage your weight, and decreases your risk for type 2 diabetes, heart disease, stroke, and high blood pressure  Exercise can also help improve your mood  Ask your healthcare provider about the best exercise plan for you  General health and safety guidelines:   • Do not smoke  Nicotine and other chemicals in cigarettes and cigars can cause lung damage  Ask your healthcare provider for information if you currently smoke and need help to quit  E-cigarettes or smokeless tobacco still contain nicotine  Talk to your healthcare provider before you use these products  • Limit alcohol  A drink of alcohol is 12 ounces of beer, 5 ounces of wine, or 1½ ounces of liquor  • Lose weight, if needed  Being overweight increases your risk of certain health conditions  These include heart disease, high blood pressure, type 2 diabetes, and certain types of cancer  • Protect your skin  Do not sunbathe or use tanning beds  Use sunscreen with a SPF 15 or higher  Apply sunscreen at least 15 minutes before you go outside  Reapply sunscreen every 2 hours  Wear protective clothing, hats, and sunglasses when you are outside  • Drive safely  Always wear your seatbelt  Make sure everyone in your car wears a seatbelt  A seatbelt can save your life if you are in an accident  Do not use your cell phone when you are driving  This could distract you and cause an accident  Pull over if you need to make a call or send a text message  • Practice safe sex  Use latex condoms if are sexually active and have more than one partner  Your healthcare provider may recommend screening tests for sexually transmitted infections (STIs)  • Wear helmets, lifejackets, and protective gear  Always wear a helmet when you ride a bike or motorcycle, go skiing, or play sports that could cause a head injury  Wear protective equipment when you play sports  Wear a lifejacket when you are on a boat or doing water sports  © Copyright [facility] 2022 Information is for End User's use only and may not be sold, redistributed or otherwise used for commercial purposes  The above information is an  only  It is not intended as medical advice for individual conditions or treatments  Talk to your doctor, nurse or pharmacist before following any medical regimen to see if it is safe and effective for you

## 2023-06-02 NOTE — PROGRESS NOTES
[facility]    NAME: Elodia Dc  AGE: 51 y o  SEX: female  : 1975     DATE: 2023     Assessment and Plan:     Problem List Items Addressed This Visit        Endocrine    Type II diabetes mellitus, well controlled ([address] )     -Well-controlled  -Currently on metformin  -Perform IRIS in office    Lab Results   Component Value Date    HGBA1C 5 1 2023            Relevant Orders    IRIS Diabetic eye exam (Completed)    POCT hemoglobin A1c (Completed)    Albumin / creatinine urine ratio    Comprehensive metabolic panel    Lipid Panel with Direct LDL reflex    TSH, 3rd generation with Free T4 reflex       Cardiovascular and Mediastinum    Essential hypertension     -Diastolic blood pressure above goal   Hypertension felt to be premarin induced   -Discussed dietary and lifestyle changes   -Continue hydrochlorothiazide 12 5 mg daily  -Patient to follow-up in 3 months to reassess blood pressure control            Other    History of PCOS     -Status post hysterectomy with salpingo-oophorectomy         Attention deficit hyperactivity disorder     -Currently on Concerta  -Med management Dr Alexsander Hutton with psychiatry         Relevant Medications    methylphenidate (CONCERTA) 18 mg ER tablet    methylphenidate (CONCERTA) 36 MG ER tablet    Counseling for estrogen replacement therapy     -Currently on Premarin for surgically induced menopause  -Followed by Dr Katie Gu  -She is interested in getting a second opinion    A referral has made to Dr Beatris Frankel in gynecology         Relevant Orders    Ambulatory Referral to Gynecology   Other Visit Diagnoses     Annual physical exam    -  Primary    Need for hepatitis C screening test        Encounter for screening mammogram for breast cancer        Relevant Orders    Mammo screening bilateral w 3d & cad    Screening for cervical cancer        Encounter for immunization        Encounter for colorectal cancer screening        Relevant Orders    Ambulatory referral to Gastroenterology          Immunizations and preventive care screenings were discussed with patient today  Appropriate education was printed on patient's after visit summary  Return in about 3 months (around 9/2/2023)  Chief Complaint:     Chief Complaint   Patient presents with   • Establish Care      History of Present Illness:     Adult Annual Physical   Patient here for a comprehensive physical exam and to establish care  The patient has a past medical history most notable for PCOS, type 2 diabetes mellitus and ADHD  The patient reports that she was initially started on metformin for management of her PCOS  At the age of 42 she underwent a laparoscopic hysterectomy with salpingo-oophorectomy which she states induced surgical menopause  She was subsequently started on Premarin which has been managed by Dr Johnna Fonseca  With regard to her diabetes she states her blood sugars have been well controlled  She continues on metformin stating when she attempted to come off of it she gained over 10 pounds before restarting the medication  She also has a history of ADHD  She is currently managed with Concerta by Dr Jose Roberto Valdez  She states she has been have difficulty finding a pharmacy that will fill 54 mg tablet of Concerta         Depression Screening  PHQ-2/9 Depression Screening    Little interest or pleasure in doing things: 0 - not at all  Feeling down, depressed, or hopeless: 0 - not at all  PHQ-2 Score: 0  PHQ-2 Interpretation: Negative depression screen          Review of Systems:     Review of Systems ROS as per HPI     Past Medical History:     Past Medical History:   Diagnosis Date   • Arthritis    • Claustrophobia    • History of PCOS    • History of seizures as a child     fever related   • Hypertension    • Seasonal allergies    • Wears reading eyeglasses       Past Surgical History:     Past Surgical History:   Procedure Laterality Date   • CERVICAL CONE BIOPSY     • ESSURE TUBAL LIGATION     • HYSTERECTOMY      age 42   • INCONTINENCE SURGERY     • OOPHORECTOMY Bilateral     age 42   • PILONIDAL CYST / SINUS EXCISION     • AR CORRJ HALLUX VALGUS W/SESMDC W/1METAR MEDIAL CNF Right 2022    Procedure: 1st metatarsal cuneiform joint fusion-Lapiplasty procedure, modified Knox bunionectomy and second digit arthrodesis at proximal interphalangeal joint with capsular release at 2nd metatarsal phalangeal joint;  Surgeon: Kelly Herrmann DPM;  Location: [facility];  Service: Podiatry   • TONSILLECTOMY        Social History:     Social History     Socioeconomic History   • Marital status: /Civil Union     Spouse name: None   • Number of children: None   • Years of education: None   • Highest education level: None   Occupational History   • None   Tobacco Use   • Smoking status: Former     Packs/day: 0 50     Years: 20 00     Total pack years: 10 00     Types: Cigarettes     Quit date: 2014     Years since quittin 0   • Smokeless tobacco: Never   Vaping Use   • Vaping Use: Never used   Substance and Sexual Activity   • Alcohol use: Yes     Comment: socially  wine   liqour   • Drug use: Never   • Sexual activity: None   Other Topics Concern   • None   Social History Narrative   • None     Social Determinants of Health     Financial Resource Strain: Not on file   Food Insecurity: Not on file   Transportation Needs: Not on file   Physical Activity: Not on file   Stress: Not on file   Social Connections: Not on file   Intimate Partner Violence: Not on file   Housing Stability: Not on file      Family History:     Family History   Problem Relation Age of Onset   • Breast cancer Maternal Grandmother    • Colon cancer Brother    • Stomach cancer Maternal Aunt    • Colon cancer Paternal Grandmother    • Lung cancer Paternal Grandmother    • Colon cancer Other       Current Medications:     Current "Outpatient Medications   Medication Sig Dispense Refill   • acetaminophen (TYLENOL) 325 mg tablet Take 650 mg by mouth every 6 (six) hours as needed for mild pain     • Ascorbic Acid (VITAMIN C PO) Take 1 tablet by mouth daily     • B Complex-C (SUPER B COMPLEX PO) Take 1 tablet by mouth daily     • buPROPion (FORFIVO XL) 450 MG 24 hr tablet Take 300 mg by mouth daily     • CALCIUM PO Take 1 tablet by mouth in the morning     • conjugated estrogens (PREMARIN) 0 625 mg tablet Take 0 625 mg by mouth in the morning  Take daily for 21 days then do not take for 7 days        • fluticasone (FLONASE) 50 mcg/act nasal spray 2 sprays into each nostril Daily     • hydrochlorothiazide (HYDRODIURIL) 12 5 mg tablet Take 12 5 mg by mouth in the morning  • metFORMIN (GLUCOPHAGE-XR) 750 mg 24 hr tablet Take 750 mg by mouth daily with breakfast     • Tretinoin 0 05 % LOTN Apply topically     • VITAMIN D PO Take 1 capsule by mouth daily     • methylphenidate (CONCERTA) 18 mg ER tablet Take 18 mg by mouth daily     • methylphenidate (CONCERTA) 36 MG ER tablet Take 36 mg by mouth daily       No current facility-administered medications for this visit        Allergies:     No Active Allergies   Physical Exam:     /94   Pulse 82   Ht 5' 4\" (1 626 m)   Wt 97 2 kg (214 lb 3 2 oz)   SpO2 97%   BMI 36 77 kg/m²     Physical Exam   General: NAD, Alert and oriented x3   HEENT: NCAT, EOMI, normal conjunctiva  Cardiovascular: RRR, normal S1 and S2, no m/r/g  Pulmonary: Normal respiratory effort, no wheezes, rales or rhonchi  GI: Soft, nontender, nondistended, normoactive bowel sounds  Musculoskeletal: Normal bulk and tone  Neuro: Non-focal, ambulating without difficulty, non-antalgic gait  Extremities: No lower extremity edema  Skin: Normal skin color, no rashes   Psychiatric: Normal mood and affect      Brad Ivan MD  [facility]  "

## 2023-06-04 PROBLEM — Z71.89 COUNSELING FOR ESTROGEN REPLACEMENT THERAPY: Status: ACTIVE | Noted: 2023-06-04

## 2023-06-04 NOTE — ASSESSMENT & PLAN NOTE
-Well-controlled  -Currently on metformin  -Perform IRIS in office    Lab Results   Component Value Date    HGBA1C 5 1 06/02/2023

## 2023-06-04 NOTE — ASSESSMENT & PLAN NOTE
-Diastolic blood pressure above goal   Hypertension felt to be premarin induced   -Discussed dietary and lifestyle changes   -Continue hydrochlorothiazide 12 5 mg daily  -Patient to follow-up in 3 months to reassess blood pressure control

## 2023-06-04 NOTE — ASSESSMENT & PLAN NOTE
-Currently on Premarin for surgically induced menopause  -Followed by Dr Mary Rivera  -She is interested in getting a second opinion    A referral has made to Dr Yuridia Mejía in gynecology

## 2023-09-20 ENCOUNTER — OFFICE VISIT (OUTPATIENT)
Dept: URGENT CARE | Facility: CLINIC | Age: 48
End: 2023-09-20

## 2023-09-20 VITALS
SYSTOLIC BLOOD PRESSURE: 128 MMHG | TEMPERATURE: 97.5 F | RESPIRATION RATE: 18 BRPM | OXYGEN SATURATION: 96 % | DIASTOLIC BLOOD PRESSURE: 88 MMHG | HEART RATE: 86 BPM

## 2023-09-20 DIAGNOSIS — M26.621 ARTHRALGIA OF RIGHT TEMPOROMANDIBULAR JOINT: ICD-10-CM

## 2023-09-20 DIAGNOSIS — H60.541 DERMATITIS OF RIGHT EAR CANAL: ICD-10-CM

## 2023-09-20 DIAGNOSIS — H60.501 ACUTE OTITIS EXTERNA OF RIGHT EAR, UNSPECIFIED TYPE: Primary | ICD-10-CM

## 2023-09-20 RX ORDER — MOMETASONE FUROATE 1 MG/G
OINTMENT TOPICAL DAILY
Qty: 45 G | Refills: 0 | Status: SHIPPED | OUTPATIENT
Start: 2023-09-20

## 2023-09-20 NOTE — PROGRESS NOTES
[facility]        NAME: Nikhil Plascencia is a 51 y.o. female  : 1975    MRN: [id number]  DATE: 2023  TIME: 9:11 AM    Assessment and Plan   Acute otitis externa of right ear, unspecified type [H60.501]  1. Acute otitis externa of right ear, unspecified type  neomycin-polymyxin-hydrocortisone (CORTISPORIN) otic solution      2. Dermatitis of right ear canal  mometasone (ELOCON) 0.1 % ointment      3. Arthralgia of right temporomandibular joint          Otitis externa  Exam revealed edema and erythema of right EAC. TM normal. Will treat with Cortisporin    Ear canal dermatitis   Refilled mometasone     TMJ  Discussed location of her pain and symptoms is consistent with TMJ disorder. Advised ice and NSAIDs. Also discussed she may need new mouthguard. Advised following up with dentist or possible PT if that pain does not improve     Patient Instructions       Follow up with PCP in 3-5 days. Proceed to  ER if symptoms worsen. Chief Complaint     Chief Complaint   Patient presents with   • Earache     Pt c/o right ear pain that started monday         History of Present Illness       Patient is a 52 yo female who presents for evaluation of right-sided ear pain x 2 days. She reports initially noticing pain outside her ear by the top of her jaw and then also developing pain inside the ear. She also reports dry flaky skin in her ear canal for which she normally uses a steroid cream but is out of it. She does state that she grinds her teeth and wears a mouthguard. The external pain is worse with chewing. No fevers. Review of Systems   Review of Systems   Constitutional: Negative for fever. HENT: Positive for ear pain. Negative for ear discharge. Neurological: Negative for dizziness and headaches.          Current Medications       Current Outpatient Medications:   •  acetaminophen (TYLENOL) 325 mg tablet, Take 650 mg by mouth every 6 (six) hours as needed for mild pain, Disp: , Rfl: •  Ascorbic Acid (VITAMIN C PO), Take 1 tablet by mouth daily, Disp: , Rfl:   •  B Complex-C (SUPER B COMPLEX PO), Take 1 tablet by mouth daily, Disp: , Rfl:   •  buPROPion (FORFIVO XL) 450 MG 24 hr tablet, Take 300 mg by mouth daily, Disp: , Rfl:   •  CALCIUM PO, Take 1 tablet by mouth in the morning, Disp: , Rfl:   •  conjugated estrogens (PREMARIN) 0.625 mg tablet, Take 0.625 mg by mouth in the morning. Take daily for 21 days then do not take for 7 days. ., Disp: , Rfl:   •  fluticasone (FLONASE) 50 mcg/act nasal spray, 2 sprays into each nostril Daily, Disp: , Rfl:   •  hydrochlorothiazide (HYDRODIURIL) 12.5 mg tablet, Take 1 tablet (12.5 mg total) by mouth daily, Disp: 30 tablet, Rfl: 0  •  metFORMIN (GLUCOPHAGE-XR) 750 mg 24 hr tablet, Take 750 mg by mouth daily with breakfast, Disp: , Rfl:   •  methylphenidate (CONCERTA) 36 MG ER tablet, Take 36 mg by mouth daily, Disp: , Rfl:   •  mometasone (ELOCON) 0.1 % ointment, Apply topically daily, Disp: 45 g, Rfl: 0  •  neomycin-polymyxin-hydrocortisone (CORTISPORIN) otic solution, Administer 4 drops to the right ear every 6 (six) hours for 7 days, Disp: 8 mL, Rfl: 0  •  VITAMIN D PO, Take 1 capsule by mouth daily, Disp: , Rfl:   •  methylphenidate (CONCERTA) 18 mg ER tablet, Take 18 mg by mouth daily, Disp: , Rfl:   •  Tretinoin 0.05 % LOTN, Apply topically, Disp: , Rfl:     Current Allergies     Allergies as of 09/20/2023   • (No Known Allergies)            The following portions of the patient's history were reviewed and updated as appropriate: allergies, current medications, past family history, past medical history, past social history, past surgical history and problem list.     Past Medical History:   Diagnosis Date   • Arthritis    • Claustrophobia    • History of PCOS    • History of seizures as a child     fever related   • Hypertension    • Seasonal allergies    • Wears reading eyeglasses        Past Surgical History:   Procedure Laterality Date   • CERVICAL CONE BIOPSY     • ESSURE TUBAL LIGATION     • HYSTERECTOMY      age 42   • INCONTINENCE SURGERY     • OOPHORECTOMY Bilateral     age 42   • PILONIDAL CYST / SINUS EXCISION     • SD CORRJ HALLUX VALGUS W/SESMDC W/1METAR MEDIAL CNF Right 5/17/2022    Procedure: 1st metatarsal cuneiform joint fusion-Lapiplasty procedure, modified Knox bunionectomy and second digit arthrodesis at proximal interphalangeal joint with capsular release at 2nd metatarsal phalangeal joint;  Surgeon: Tiffany Dotson DPM;  Location: [facility];  Service: Podiatry   • TONSILLECTOMY         Family History   Problem Relation Age of Onset   • Breast cancer Maternal Grandmother    • Colon cancer Brother    • Stomach cancer Maternal Aunt    • Colon cancer Paternal Grandmother    • Lung cancer Paternal Grandmother    • Colon cancer Other          Medications have been verified. Objective   /88   Pulse 86   Temp 97.5 °F (36.4 °C) (Temporal)   Resp 18   SpO2 96%        Physical Exam     Physical Exam  Constitutional:       General: She is not in acute distress. Appearance: She is not toxic-appearing. HENT:      Head:      Comments: Tenderness over right TMJ. No popping or clicking      Ears:      Comments: Right EAC with edema and erythema. Moderate amount of flaky skin  Cardiovascular:      Rate and Rhythm: Normal rate. Pulmonary:      Effort: Pulmonary effort is normal.   Skin:     General: Skin is warm and dry. Neurological:      Mental Status: She is alert.    Psychiatric:         Mood and Affect: Mood normal.         Behavior: Behavior normal.

## 2023-10-16 ENCOUNTER — OFFICE VISIT (OUTPATIENT)
Dept: URGENT CARE | Facility: CLINIC | Age: 48
End: 2023-10-16
Payer: COMMERCIAL

## 2023-10-16 VITALS
RESPIRATION RATE: 18 BRPM | SYSTOLIC BLOOD PRESSURE: 150 MMHG | TEMPERATURE: 98.1 F | OXYGEN SATURATION: 98 % | HEART RATE: 80 BPM | DIASTOLIC BLOOD PRESSURE: 108 MMHG

## 2023-10-16 DIAGNOSIS — R42 DIZZINESS AND GIDDINESS: Primary | ICD-10-CM

## 2023-10-16 PROCEDURE — G0382 LEV 3 HOSP TYPE B ED VISIT: HCPCS | Performed by: PHYSICIAN ASSISTANT

## 2023-10-16 PROCEDURE — 99283 EMERGENCY DEPT VISIT LOW MDM: CPT | Performed by: PHYSICIAN ASSISTANT

## 2023-10-16 RX ORDER — BUPROPION HYDROCHLORIDE 300 MG/1
TABLET ORAL
COMMUNITY
Start: 2023-10-11

## 2023-10-16 RX ORDER — METHYLPHENIDATE HYDROCHLORIDE 54 MG/1
54 TABLET ORAL 2 TIMES DAILY
COMMUNITY
Start: 2023-09-23

## 2023-10-16 NOTE — PROGRESS NOTES
[facility]        NAME: Adam Mosqueda is a 51 y.o. female  : 1975    MRN: [id number]  DATE: 2023  TIME: 1:44 PM    Assessment and Plan   Dizziness and giddiness [R42]  1. Dizziness and giddiness              Patient Instructions     Dizziness  Referred to the emergency room  Follow up with PCP in 3-5 days. Proceed to  ER if symptoms worsen. Chief Complaint     Chief Complaint   Patient presents with    Dizziness     Pt c/o dizziness that started last night. History of Present Illness       71-year-old female who presents complaining of dizziness x3 days on and off. Sensation is not brought on by changes in position. Describes the sensation as feeling like she is going to pass out and off balance. Denies nausea, vomiting, fevers, chills, chest pain, palpitations, diaphoresis. Dizziness        Review of Systems   Review of Systems   Neurological:  Positive for dizziness. Current Medications       Current Outpatient Medications:     buPROPion (WELLBUTRIN XL) 300 mg 24 hr tablet, , Disp: , Rfl:     conjugated estrogens (PREMARIN) 0.625 mg tablet, Take 0.625 mg by mouth in the morning. Take daily for 21 days then do not take for 7 days. ., Disp: , Rfl:     fluticasone (FLONASE) 50 mcg/act nasal spray, 2 sprays into each nostril Daily, Disp: , Rfl:     hydrochlorothiazide (HYDRODIURIL) 12.5 mg tablet, Take 1 tablet (12.5 mg total) by mouth daily, Disp: 30 tablet, Rfl: 0    metFORMIN (GLUCOPHAGE-XR) 750 mg 24 hr tablet, Take 750 mg by mouth daily with breakfast, Disp: , Rfl:     methylphenidate (CONCERTA) 54 MG ER tablet, 54 mg 2 (two) times a day, Disp: , Rfl:     acetaminophen (TYLENOL) 325 mg tablet, Take 650 mg by mouth every 6 (six) hours as needed for mild pain, Disp: , Rfl:     Ascorbic Acid (VITAMIN C PO), Take 1 tablet by mouth daily, Disp: , Rfl:     B Complex-C (SUPER B COMPLEX PO), Take 1 tablet by mouth daily, Disp: , Rfl:     buPROPion (FORFIVO XL) 450 MG 24 hr tablet, Take 300 mg by mouth daily (Patient not taking: Reported on 10/16/2023), Disp: , Rfl:     CALCIUM PO, Take 1 tablet by mouth in the morning, Disp: , Rfl:     methylphenidate (CONCERTA) 18 mg ER tablet, Take 18 mg by mouth daily, Disp: , Rfl:     methylphenidate (CONCERTA) 36 MG ER tablet, Take 36 mg by mouth daily (Patient not taking: Reported on 10/16/2023), Disp: , Rfl:     mometasone (ELOCON) 0.1 % ointment, Apply topically daily, Disp: 45 g, Rfl: 0    neomycin-polymyxin-hydrocortisone (CORTISPORIN) otic solution, Administer 4 drops to the right ear every 6 (six) hours for 7 days, Disp: 8 mL, Rfl: 0    Tretinoin 0.05 % LOTN, Apply topically, Disp: , Rfl:     VITAMIN D PO, Take 1 capsule by mouth daily, Disp: , Rfl:     Current Allergies     Allergies as of 10/16/2023    (No Known Allergies)            The following portions of the patient's history were reviewed and updated as appropriate: allergies, current medications, past family history, past medical history, past social history, past surgical history and problem list.     Past Medical History:   Diagnosis Date    Arthritis     Claustrophobia     History of PCOS     History of seizures as a child     fever related    Hypertension     Seasonal allergies     Wears reading eyeglasses        Past Surgical History:   Procedure Laterality Date    CERVICAL CONE BIOPSY      ESSURE TUBAL LIGATION      HYSTERECTOMY      age 42    INCONTINENCE SURGERY      OOPHORECTOMY Bilateral     age 42    PILONIDAL CYST / SINUS EXCISION      AK CORRJ HALLUX VALGUS W/SESMDC W/1METAR MEDIAL CNF Right 5/17/2022    Procedure: 1st metatarsal cuneiform joint fusion-Lapiplasty procedure, modified Knox bunionectomy and second digit arthrodesis at proximal interphalangeal joint with capsular release at 2nd metatarsal phalangeal joint;  Surgeon: Rodríguez Hernandez DPM;  Location: [facility];  Service: Podiatry    TONSILLECTOMY         Family History   Problem Relation Age of Onset    Breast cancer Maternal Grandmother     Colon cancer Brother     Stomach cancer Maternal Aunt     Colon cancer Paternal Grandmother     Lung cancer Paternal Grandmother     Colon cancer Other          Medications have been verified. Objective   BP (!) 150/108 (Patient Position: Standing)   Pulse 80   Temp 98.1 °F (36.7 °C) (Temporal)   Resp 18   SpO2 98%        Physical Exam     Physical Exam  Constitutional:       Appearance: Normal appearance. She is well-developed. HENT:      Head: Normocephalic and atraumatic. Right Ear: External ear normal.      Left Ear: External ear normal.      Nose: Nose normal.      Mouth/Throat:      Pharynx: No oropharyngeal exudate. Cardiovascular:      Rate and Rhythm: Normal rate and regular rhythm. Heart sounds: Normal heart sounds. Pulmonary:      Effort: Pulmonary effort is normal. No respiratory distress. Breath sounds: Normal breath sounds. No stridor. No wheezing, rhonchi or rales. Chest:      Chest wall: No tenderness. Musculoskeletal:      Cervical back: Normal range of motion and neck supple. Lymphadenopathy:      Cervical: No cervical adenopathy. Neurological:      Mental Status: She is alert and oriented to person, place, and time. GCS: GCS eye subscore is 4. GCS verbal subscore is 5. GCS motor subscore is 6. Cranial Nerves: Cranial nerves 2-12 are intact. Sensory: Sensation is intact. Motor: Motor function is intact. Coordination: Coordination is intact. Gait: Gait is intact.      EKG- NSR, rate 86, no ST abnormalities

## 2023-10-16 NOTE — PATIENT INSTRUCTIONS
Dizziness  Referred to the emergency room  Follow up with PCP in 3-5 days. Proceed to  ER if symptoms worsen.

## 2023-10-30 ENCOUNTER — PREP FOR PROCEDURE (OUTPATIENT)
Dept: OBGYN CLINIC | Facility: OTHER | Age: 48
End: 2023-10-30

## 2023-10-30 DIAGNOSIS — M20.12 HALLUX VALGUS, ACQUIRED, LEFT: Primary | ICD-10-CM

## 2023-11-01 ENCOUNTER — ANESTHESIA EVENT (OUTPATIENT)
Dept: PERIOP | Facility: HOSPITAL | Age: 48
End: 2023-11-01
Payer: COMMERCIAL

## 2023-11-02 RX ORDER — BUPROPION HYDROCHLORIDE 200 MG/1
200 TABLET, EXTENDED RELEASE ORAL 2 TIMES DAILY
COMMUNITY
Start: 2023-10-09

## 2023-11-02 RX ORDER — DIPHENOXYLATE HYDROCHLORIDE AND ATROPINE SULFATE 2.5; .025 MG/1; MG/1
1 TABLET ORAL DAILY
COMMUNITY

## 2023-11-02 NOTE — PRE-PROCEDURE INSTRUCTIONS
Pre-Surgery Instructions:   Medication Instructions    acetaminophen (TYLENOL) 325 mg tablet Uses PRN- OK to take day of surgery    Ascorbic Acid (VITAMIN C PO) Stop taking 7 days prior to surgery. buPROPion (WELLBUTRIN SR) 200 MG 12 hr tablet Take day of surgery. COLLAGEN PO Stop taking 7 days prior to surgery. conjugated estrogens (PREMARIN) 0.625 mg tablet Take day of surgery. fluticasone (FLONASE) 50 mcg/act nasal spray Uses PRN- OK to take day of surgery    Gluc-Chonn-MSM-Boswellia-Vit D (GLUCOSAMINE CHONDROITIN + D3 PO) Stop taking 7 days prior to surgery. hydrochlorothiazide (HYDRODIURIL) 12.5 mg tablet Hold day of surgery. metFORMIN (GLUCOPHAGE-XR) 750 mg 24 hr tablet Hold day of surgery. methylphenidate (CONCERTA) 36 MG ER tablet Hold day of surgery. methylphenidate (CONCERTA) 54 MG ER tablet Hold day of surgery. multivitamin (THERAGRAN) TABS Stop taking 7 days prior to surgery. Tretinoin 0.05 % LOTN Hold day of surgery. Medication instructions for day surgery reviewed. Please use only a sip of water to take your instructed medications. Avoid all over the counter vitamins, supplements and NSAIDS for one week prior to surgery per anesthesia guidelines. Tylenol is ok to take as needed. You will receive a call one business day prior to surgery with an arrival time and hospital directions. If your surgery is scheduled on a Monday, the hospital will be calling you on the Friday prior to your surgery. If you have not heard from anyone by 8pm, please call the hospital supervisor through the hospital  at [phone]. Usman Tian [phone]). Do not eat or drink anything after midnight the night before your surgery, including candy, mints, lifesavers, or chewing gum. Do not drink alcohol 24hrs before your surgery. Try not to smoke at least 24hrs before your surgery.        Follow the pre surgery showering instructions as listed in the [facility] Surgical Experience Booklet” or otherwise provided by your surgeon's office. Do not use a blade to shave the surgical area 1 week before surgery. It is okay to use a clean electric clippers up to 24 hours before surgery. Do not apply any lotions, creams, including makeup, cologne, deodorant, or perfumes after showering on the day of your surgery. Do not use dry shampoo, hair spray, hair gel, or any type of hair products. No contact lenses, eye make-up, or artificial eyelashes. Remove nail polish, including gel polish, and any artificial, gel, or acrylic nails if possible. Remove all jewelry including rings and body piercing jewelry. Wear causal clothing that is easy to take on and off. Consider your type of surgery. Keep any valuables, jewelry, piercings at home. Please bring any specially ordered equipment (sling, braces) if indicated. Arrange for a responsible person to drive you to and from the hospital on the day of your surgery. Visitor Guidelines discussed. Call the surgeon's office with any new illnesses, exposures, or additional questions prior to surgery. Please reference your [facility] Surgical Experience Booklet” for additional information to prepare for your upcoming surgery.

## 2023-11-08 ENCOUNTER — CONSULT (OUTPATIENT)
Dept: INTERNAL MEDICINE CLINIC | Facility: CLINIC | Age: 48
End: 2023-11-08

## 2023-11-08 ENCOUNTER — TELEPHONE (OUTPATIENT)
Age: 48
End: 2023-11-08

## 2023-11-08 VITALS
OXYGEN SATURATION: 97 % | SYSTOLIC BLOOD PRESSURE: 138 MMHG | WEIGHT: 205.8 LBS | HEART RATE: 89 BPM | DIASTOLIC BLOOD PRESSURE: 92 MMHG | HEIGHT: 64 IN | BODY MASS INDEX: 35.13 KG/M2

## 2023-11-08 DIAGNOSIS — Z12.31 ENCOUNTER FOR SCREENING MAMMOGRAM FOR BREAST CANCER: ICD-10-CM

## 2023-11-08 DIAGNOSIS — Z87.42 HISTORY OF PCOS: ICD-10-CM

## 2023-11-08 DIAGNOSIS — Z86.39 HISTORY OF DIABETES MELLITUS, TYPE II: ICD-10-CM

## 2023-11-08 DIAGNOSIS — Z01.818 PREOP EXAMINATION: Primary | ICD-10-CM

## 2023-11-08 DIAGNOSIS — I10 ESSENTIAL HYPERTENSION: ICD-10-CM

## 2023-11-08 DIAGNOSIS — M20.12 HALLUX VALGUS, LEFT: ICD-10-CM

## 2023-11-08 RX ORDER — AMLODIPINE BESYLATE 5 MG/1
5 TABLET ORAL DAILY
Qty: 90 TABLET | Refills: 0 | Status: SHIPPED | OUTPATIENT
Start: 2023-11-08

## 2023-11-08 RX ORDER — DULOXETIN HYDROCHLORIDE 30 MG/1
30 CAPSULE, DELAYED RELEASE ORAL DAILY
COMMUNITY
Start: 2023-10-09

## 2023-11-08 NOTE — ASSESSMENT & PLAN NOTE
-Patient is of low cardiac risk for the proposed procedure. No further cardiac work-up is indicated. Her blood pressure is not currently optimized. She has been started on amlodipine 5 mg daily and will return to the office for a blood pressure check on 11/13. If her BP is reasonably well controlled she will be given full clearance for surgery. CMP and CBC pending. Point-of-care EKG revealed sinus rhythm with sinus arrhythmia.

## 2023-11-08 NOTE — PROGRESS NOTES
Presurgical Evaluation    Subjective:      Patient ID: Ramona Latif is a 51 y.o. female. Chief Complaint   Patient presents with   • Pre-op Exam     HPI    The following portions of the patient's history were reviewed and updated as appropriate: allergies, current medications, past family history, past medical history, past social history, past surgical history, and problem list.    Procedure date: 11/14/23    Surgeon:  Dr. Arlene North   Planned procedure:  Dorla Ports style bunionectomy of the left foot with plate and screw fixation   Diagnosis for procedure:  Hallux valgus, acquired, left     The patient is seen for perioperative risk stratification. Patient reports no symptoms of chest pain at rest or with exertion, dyspnea at rest or with exertion, PND, lower extremity edema, claudication or palpitations. Patient has no history of ischemic heart disease or CHF. Review of Systems  All other systems negative except for pertinent findings noted in HPI. Current Outpatient Medications   Medication Sig Dispense Refill   • acetaminophen (TYLENOL) 325 mg tablet Take 650 mg by mouth every 6 (six) hours as needed for mild pain     • amLODIPine (NORVASC) 5 mg tablet Take 1 tablet (5 mg total) by mouth daily 90 tablet 0   • Ascorbic Acid (VITAMIN C PO) Take 1 tablet by mouth daily     • buPROPion (WELLBUTRIN SR) 200 MG 12 hr tablet Take 200 mg by mouth 2 (two) times a day     • CALCIUM PO Take 1 tablet by mouth in the morning     • COLLAGEN PO Take by mouth in the morning     • conjugated estrogens (PREMARIN) 0.625 mg tablet Take 0.625 mg by mouth in the morning. Take daily for 21 days then do not take for 7 days. .     • DULoxetine (CYMBALTA) 30 mg delayed release capsule Take 30 mg by mouth daily     • fluticasone (FLONASE) 50 mcg/act nasal spray 2 sprays into each nostril if needed     • Gluc-Chonn-MSM-Boswellia-Vit D (GLUCOSAMINE CHONDROITIN + D3 PO) Take 2 tablets by mouth in the morning     • hydrochlorothiazide (HYDRODIURIL) 12.5 mg tablet Take 1 tablet (12.5 mg total) by mouth daily 30 tablet 0   • metFORMIN (GLUCOPHAGE-XR) 750 mg 24 hr tablet Take 750 mg by mouth 2 (two) times a day     • methylphenidate (CONCERTA) 54 MG ER tablet 54 mg 2 (two) times a day     • mometasone (ELOCON) 0.1 % ointment Apply topically daily (Patient taking differently: Apply topically if needed) 45 g 0   • multivitamin (THERAGRAN) TABS Take 1 tablet by mouth daily     • Tretinoin 0.05 % LOTN Apply topically if needed     • VITAMIN D PO Take 1 capsule by mouth daily       No current facility-administered medications for this visit. Allergies on file:   Patient has no known allergies.     Past Medical History:   Diagnosis Date   • Anxiety    • Arthritis    • Claustrophobia    • Depression    • History of PCOS    • History of seizures as a child     fever related   • Hypertension    • Seasonal allergies    • Wears reading eyeglasses        Past Surgical History:   Procedure Laterality Date   • CERVICAL CONE BIOPSY     • ESSURE TUBAL LIGATION     • HYSTERECTOMY      age 42   • INCONTINENCE SURGERY      bladder sling   • OOPHORECTOMY Bilateral     age 42   • PILONIDAL CYST / SINUS EXCISION     • [place] CORRJ HALLUX VALGUS W/SESMDC W/1METAR MEDIAL CNF Right 05/17/2022    Procedure: 1st metatarsal cuneiform joint fusion-Lapiplasty procedure, modified Knox bunionectomy and second digit arthrodesis at proximal interphalangeal joint with capsular release at 2nd metatarsal phalangeal joint;  Surgeon: Edwin Abraham DPM;  Location: [facility];  Service: Podiatry   • TONSILLECTOMY     • WRIST SURGERY Left        Family History   Problem Relation Age of Onset   • Breast cancer Maternal Grandmother    • Colon cancer Brother    • Stomach cancer Maternal Aunt    • Colon cancer Paternal Grandmother    • Lung cancer Paternal Grandmother    • Colon cancer Other        Social History     Tobacco Use   • Smoking status: Former Packs/day: 0.50     Years: 20.00     Total pack years: 10.00     Types: Cigarettes     Quit date: 2014     Years since quittin.4   • Smokeless tobacco: Never   Vaping Use   • Vaping Use: Never used   Substance Use Topics   • Alcohol use: Not Currently     Alcohol/week: 2.0 standard drinks of alcohol     Types: 2 Glasses of wine per week     Comment: socially. wine. liqour   • Drug use: Never       Objective:    Vitals:    23 1313 23 1406   BP: 140/100 138/92   Pulse: (!) 111 89   SpO2: 97%    Weight: 93.4 kg (205 lb 12.8 oz)    Height: 5' 4" (1.626 m)        BP Readings from Last 3 Encounters:   23 138/92   10/16/23 (!) 150/108   23 128/88        Wt Readings from Last 3 Encounters:   23 93.4 kg (205 lb 12.8 oz)   23 97.2 kg (214 lb 3.2 oz)   22 104 kg (228 lb 6.3 oz)        Physical Exam      General: NAD   HEENT: NCAT, EOMI, normal conjunctiva  Cardiovascular: RRR, normal S1 and S2, no m/r/g  Pulmonary: Normal respiratory effort, no wheezes, rales or rhonchi  GI: Soft, nontender, nondistended, normoactive bowel sounds  Musculoskeletal: Normal bulk and tone  Neuro: Non-focal, ambulating without difficulty, non-antalgic gait  Extremities: No lower extremity edema  Skin: Normal skin color, no rashes   Psychiatric: Normal mood and affect      Preop labs/testing available and reviewed: no               RCRI  High Risk surgery? 1 Point  CAD History:         1 Point   MI; Positive Stress Test; CP due to Mi;  Nitrate Usage to control Angina;  Pathologic Q wave on EKG  CHF Active:         1 Point   Pulm Edema; Paroxysmal Nocturnal Dyspnea;  Bibasilar Rales (crackles);S3; CHF on CXR  Cerebrovascular Disease (TIA or CVA):     1 Point  DM on Insulin:        1 Point  Serum Creat >2.0 mg/dl:       1 Point          Total Points: 0     Scorin: Class I, Very Low Risk (0.4%)     1: Class II, Low risk (0.9%)     2: Class III Moderate (6.6%)     3: Class IV High (>11%)        Assessment/Plan:    Patient is medically optimized (cleared) for the planned procedure. 1. Preop examination  Assessment & Plan:  -Patient is of low cardiac risk for the proposed procedure. No further cardiac work-up is indicated. Her blood pressure is not currently optimized. She has been started on amlodipine 5 mg daily and will return to the office for a blood pressure check on 11/13. If her BP is reasonably well controlled she will be given full clearance for surgery. CMP and CBC pending. Point-of-care EKG revealed sinus rhythm with sinus arrhythmia. Orders:  -     POCT ECG  -     Comprehensive metabolic panel; Future  -     CBC and differential; Future    2. Hallux valgus, left    3. Essential hypertension  Assessment & Plan:  -BP above goal.  BP on intake was 140/100 and prior to this when the patient was at an urgent care on 10/16 her blood pressure was in the 150s over 100s. To better optimize her blood pressure she will be started on Norvasc 5 mg daily. Patient scheduled to return for blood pressure check on 11/13.  -Hold HCTZ the morning of surgery     Orders:  -     Lipid Panel with Direct LDL reflex; Future  -     amLODIPine (NORVASC) 5 mg tablet; Take 1 tablet (5 mg total) by mouth daily    4. History of diabetes mellitus, type II  Assessment & Plan:  -Well controlled   -Check Hgb A1c   -If still less than 5.7 her metformin dosing will be reduced to 750 mg daily. We discussed discontinuing completely however she notes when she came off of it in the past she gained significant weight. Lab Results   Component Value Date    HGBA1C 5.1 06/02/2023       Orders:  -     HEMOGLOBIN A1C W/ EAG ESTIMATION; Future  -     Lipid Panel with Direct LDL reflex; Future    5. History of PCOS  Assessment & Plan:  -Status post hysterectomy with salpingo-oophorectomy      6.  Encounter for screening mammogram for breast cancer  -     Mammo screening bilateral w 3d & cad; Future; Expected date: 11/08/2023

## 2023-11-08 NOTE — ASSESSMENT & PLAN NOTE
-Well controlled   -Check Hgb A1c   -If still less than 5.7 her metformin dosing will be reduced to 750 mg daily. We discussed discontinuing completely however she notes when she came off of it in the past she gained significant weight.       Lab Results   Component Value Date    HGBA1C 5.1 06/02/2023

## 2023-11-08 NOTE — TELEPHONE ENCOUNTER
Patient called to see if labs can be printed out  for her when she comes for her appointment today at 1:20.  She was unable to complete them beforehand

## 2023-11-08 NOTE — PATIENT INSTRUCTIONS
-Please start Amlodipine for better blood pressure control  -follow-up on 11/13/23 for a repeat blood pressure check   -Please go for blood work   -Hold hydrochlorothiazide the day of surgery

## 2023-11-08 NOTE — ASSESSMENT & PLAN NOTE
-BP above goal.  BP on intake was 140/100 and prior to this when the patient was at an urgent care on 10/16 her blood pressure was in the 150s over 100s. To better optimize her blood pressure she will be started on Norvasc 5 mg daily.   Patient scheduled to return for blood pressure check on 11/13.  -Hold HCTZ the morning of surgery

## 2023-11-10 LAB
CREAT ?TM UR-SCNC: 199.5 UMOL/L
EXT ALBUMIN URINE RANDOM: 8.6
HBA1C MFR BLD HPLC: 5.3 %
MICROALBUMIN/CREAT UR: 4 MG/G{CREAT}

## 2023-11-13 ENCOUNTER — OFFICE VISIT (OUTPATIENT)
Dept: INTERNAL MEDICINE CLINIC | Facility: CLINIC | Age: 48
End: 2023-11-13
Payer: COMMERCIAL

## 2023-11-13 VITALS
HEART RATE: 81 BPM | SYSTOLIC BLOOD PRESSURE: 136 MMHG | DIASTOLIC BLOOD PRESSURE: 84 MMHG | WEIGHT: 205 LBS | OXYGEN SATURATION: 98 % | HEIGHT: 64 IN | BODY MASS INDEX: 35 KG/M2

## 2023-11-13 DIAGNOSIS — I10 ESSENTIAL HYPERTENSION: Primary | ICD-10-CM

## 2023-11-13 PROCEDURE — 99213 OFFICE O/P EST LOW 20 MIN: CPT | Performed by: INTERNAL MEDICINE

## 2023-11-13 NOTE — H&P (VIEW-ONLY)
Name: Nicole Trinidad      : 1975      MRN: [id number]  Encounter Provider: Sera Lagunas MD  Encounter Date: 2023   Encounter department: [facility]     . Essential hypertension  Assessment & Plan:  -Blood pressure better controlled on Amlodipine  -Reviewed patient's outside labs from [address] [place] [address]  -She may proceed with surgery on  as scheduled             Subjective      HPI  Patient presents today for follow-up of her blood pressure prior to her bunion surgery tomorrow with Dr. Danita Hernandez. Overall she reports she is tolerating the blood pressure medication well. All other systems negative except for pertinent findings noted in HPI. Current Outpatient Medications on File Prior to Visit   Medication Sig   • acetaminophen (TYLENOL) 325 mg tablet Take 650 mg by mouth every 6 (six) hours as needed for mild pain   • amLODIPine (NORVASC) 5 mg tablet Take 1 tablet (5 mg total) by mouth daily   • Ascorbic Acid (VITAMIN C PO) Take 1 tablet by mouth daily   • buPROPion (WELLBUTRIN SR) 200 MG 12 hr tablet Take 200 mg by mouth 2 (two) times a day   • CALCIUM PO Take 1 tablet by mouth in the morning   • COLLAGEN PO Take by mouth in the morning   • conjugated estrogens (PREMARIN) 0.625 mg tablet Take 0.625 mg by mouth in the morning. Take daily for 21 days then do not take for 7 days. .   • DULoxetine (CYMBALTA) 30 mg delayed release capsule Take 30 mg by mouth daily   • fluticasone (FLONASE) 50 mcg/act nasal spray 2 sprays into each nostril if needed   • Gluc-Chonn-MSM-Boswellia-Vit D (GLUCOSAMINE CHONDROITIN + D3 PO) Take 2 tablets by mouth in the morning   • hydrochlorothiazide (HYDRODIURIL) 12.5 mg tablet Take 1 tablet (12.5 mg total) by mouth daily   • metFORMIN (GLUCOPHAGE-XR) 750 mg 24 hr tablet Take 750 mg by mouth 2 (two) times a day   • methylphenidate (CONCERTA) 54 MG ER tablet 54 mg 2 (two) times a day   • mometasone (ELOCON) 0.1 % ointment Apply topically daily (Patient taking differently: Apply topically if needed)   • multivitamin (THERAGRAN) TABS Take 1 tablet by mouth daily   • Tretinoin 0.05 % LOTN Apply topically if needed   • VITAMIN D PO Take 1 capsule by mouth daily       Objective     /84 (BP Location: Left arm, Patient Position: Sitting)   Pulse 81   Ht 5' 4" (1.626 m)   Wt 93 kg (205 lb)   SpO2 98%   BMI 35.19 kg/m²     BP Readings from Last 3 Encounters:   11/13/23 136/84   11/08/23 138/92   10/16/23 (!) 150/108        Wt Readings from Last 3 Encounters:   11/13/23 93 kg (205 lb)   11/08/23 93.4 kg (205 lb 12.8 oz)   06/02/23 97.2 kg (214 lb 3.2 oz)       Physical Exam    General: NAD  HEENT: NCAT, EOMI, normal conjunctiva  Cardiovascular: RRR, normal S1 and S2, no m/r/g  Pulmonary: Normal respiratory effort, no wheezes, rales or rhonchi  Neuro: Non-focal, ambulating without difficulty, non-antalgic gait  Extremities: No lower extremity edema  Skin: Normal skin color, no rashes     Brad House MD

## 2023-11-13 NOTE — Clinical Note
Ethan Salguero,  I saw Myrtle Salazar last week for preop clearance. Her blood pressure was elevated at the time and I started her on amlodipine 5 mg daily. I brought her back today and her blood pressure is better controlled. She may proceed with surgery tomorrow as planned. Thanks.    Brad

## 2023-11-13 NOTE — PROGRESS NOTES
Name: Miki Weinstein      : 1975      MRN: [id number]  Encounter Provider: Babak Campos MD  Encounter Date: 2023   [facility]. Essential hypertension  Assessment & Plan:  -Blood pressure better controlled on Amlodipine  -Reviewed patient's outside labs from [address] [place] [address]  -She may proceed with surgery on  as scheduled             Subjective      HPI  Patient presents today for follow-up of her blood pressure prior to her bunion surgery tomorrow with Dr. Veronica Rodriguez. Overall she reports she is tolerating the blood pressure medication well. All other systems negative except for pertinent findings noted in HPI. Current Outpatient Medications on File Prior to Visit   Medication Sig   • acetaminophen (TYLENOL) 325 mg tablet Take 650 mg by mouth every 6 (six) hours as needed for mild pain   • amLODIPine (NORVASC) 5 mg tablet Take 1 tablet (5 mg total) by mouth daily   • Ascorbic Acid (VITAMIN C PO) Take 1 tablet by mouth daily   • buPROPion (WELLBUTRIN SR) 200 MG 12 hr tablet Take 200 mg by mouth 2 (two) times a day   • CALCIUM PO Take 1 tablet by mouth in the morning   • COLLAGEN PO Take by mouth in the morning   • conjugated estrogens (PREMARIN) 0.625 mg tablet Take 0.625 mg by mouth in the morning. Take daily for 21 days then do not take for 7 days. .   • DULoxetine (CYMBALTA) 30 mg delayed release capsule Take 30 mg by mouth daily   • fluticasone (FLONASE) 50 mcg/act nasal spray 2 sprays into each nostril if needed   • Gluc-Chonn-MSM-Boswellia-Vit D (GLUCOSAMINE CHONDROITIN + D3 PO) Take 2 tablets by mouth in the morning   • hydrochlorothiazide (HYDRODIURIL) 12.5 mg tablet Take 1 tablet (12.5 mg total) by mouth daily   • metFORMIN (GLUCOPHAGE-XR) 750 mg 24 hr tablet Take 750 mg by mouth 2 (two) times a day   • methylphenidate (CONCERTA) 54 MG ER tablet 54 mg 2 (two) times a day   • mometasone (ELOCON) 0.1 % ointment Apply topically daily (Patient taking differently: Apply topically if needed)   • multivitamin (THERAGRAN) TABS Take 1 tablet by mouth daily   • Tretinoin 0.05 % LOTN Apply topically if needed   • VITAMIN D PO Take 1 capsule by mouth daily       Objective     /84 (BP Location: Left arm, Patient Position: Sitting)   Pulse 81   Ht 5' 4" (1.626 m)   Wt 93 kg (205 lb)   SpO2 98%   BMI 35.19 kg/m²     BP Readings from Last 3 Encounters:   11/13/23 136/84   11/08/23 138/92   10/16/23 (!) 150/108        Wt Readings from Last 3 Encounters:   11/13/23 93 kg (205 lb)   11/08/23 93.4 kg (205 lb 12.8 oz)   06/02/23 97.2 kg (214 lb 3.2 oz)       Physical Exam    General: NAD  HEENT: NCAT, EOMI, normal conjunctiva  Cardiovascular: RRR, normal S1 and S2, no m/r/g  Pulmonary: Normal respiratory effort, no wheezes, rales or rhonchi  Neuro: Non-focal, ambulating without difficulty, non-antalgic gait  Extremities: No lower extremity edema  Skin: Normal skin color, no rashes     Brad Martínez MD

## 2023-11-13 NOTE — ASSESSMENT & PLAN NOTE
-Blood pressure better controlled on Amlodipine  -Reviewed patient's outside labs from [address] [place] [address]  -She may proceed with surgery on 11/14 as scheduled

## 2023-11-14 ENCOUNTER — HOSPITAL ENCOUNTER (OUTPATIENT)
Dept: RADIOLOGY | Facility: HOSPITAL | Age: 48
Setting detail: OUTPATIENT SURGERY
Discharge: HOME | End: 2023-11-14
Payer: COMMERCIAL

## 2023-11-14 ENCOUNTER — ANESTHESIA (OUTPATIENT)
Dept: PERIOP | Facility: HOSPITAL | Age: 48
End: 2023-11-14
Payer: COMMERCIAL

## 2023-11-14 ENCOUNTER — APPOINTMENT (OUTPATIENT)
Dept: RADIOLOGY | Facility: HOSPITAL | Age: 48
End: 2023-11-14
Payer: COMMERCIAL

## 2023-11-14 ENCOUNTER — HOSPITAL ENCOUNTER (OUTPATIENT)
Facility: HOSPITAL | Age: 48
Setting detail: OUTPATIENT SURGERY
Discharge: HOME | End: 2023-11-14
Attending: PODIATRIST | Admitting: PODIATRIST
Payer: COMMERCIAL

## 2023-11-14 VITALS
RESPIRATION RATE: 16 BRPM | TEMPERATURE: 97.6 F | WEIGHT: 198.63 LBS | HEART RATE: 75 BPM | DIASTOLIC BLOOD PRESSURE: 81 MMHG | BODY MASS INDEX: 34.1 KG/M2 | OXYGEN SATURATION: 98 % | SYSTOLIC BLOOD PRESSURE: 123 MMHG

## 2023-11-14 DIAGNOSIS — Z01.818 PRE-OP TESTING: ICD-10-CM

## 2023-11-14 DIAGNOSIS — G89.18 POST-OPERATIVE PAIN: ICD-10-CM

## 2023-11-14 DIAGNOSIS — E11.9 TYPE II DIABETES MELLITUS, WELL CONTROLLED (HCC): ICD-10-CM

## 2023-11-14 DIAGNOSIS — I10 ESSENTIAL HYPERTENSION: ICD-10-CM

## 2023-11-14 DIAGNOSIS — E66.9 NON MORBID OBESITY: Primary | ICD-10-CM

## 2023-11-14 DIAGNOSIS — M20.12 HALLUX VALGUS, ACQUIRED, LEFT: ICD-10-CM

## 2023-11-14 PROCEDURE — C1713 ANCHOR/SCREW BN/BN,TIS/BN: HCPCS | Performed by: PODIATRIST

## 2023-11-14 PROCEDURE — 73630 X-RAY EXAM OF FOOT: CPT

## 2023-11-14 PROCEDURE — 73620 X-RAY EXAM OF FOOT: CPT

## 2023-11-14 DEVICE — ANATOMIC BIPLANAR FIXATION
Type: IMPLANTABLE DEVICE | Site: FOOT | Status: FUNCTIONAL
Brand: LAPIPLASTY SYSTEM 1

## 2023-11-14 DEVICE — 3.0MM INTERFRAG SCREW
Type: IMPLANTABLE DEVICE | Site: FOOT | Status: FUNCTIONAL
Brand: LAPIPLASTY

## 2023-11-14 RX ORDER — MAGNESIUM HYDROXIDE 1200 MG/15ML
LIQUID ORAL AS NEEDED
Status: DISCONTINUED | OUTPATIENT
Start: 2023-11-14 | End: 2023-11-14 | Stop reason: HOSPADM

## 2023-11-14 RX ORDER — MIDAZOLAM HYDROCHLORIDE 2 MG/2ML
INJECTION, SOLUTION INTRAMUSCULAR; INTRAVENOUS AS NEEDED
Status: DISCONTINUED | OUTPATIENT
Start: 2023-11-14 | End: 2023-11-14

## 2023-11-14 RX ORDER — CEFAZOLIN SODIUM 2 G/50ML
2000 SOLUTION INTRAVENOUS EVERY 8 HOURS
Status: CANCELLED | OUTPATIENT
Start: 2023-11-14

## 2023-11-14 RX ORDER — HYDROMORPHONE HCL/PF 1 MG/ML
0.5 SYRINGE (ML) INJECTION
Status: DISCONTINUED | OUTPATIENT
Start: 2023-11-14 | End: 2023-11-14 | Stop reason: HOSPADM

## 2023-11-14 RX ORDER — LIDOCAINE HYDROCHLORIDE 20 MG/ML
INJECTION, SOLUTION EPIDURAL; INFILTRATION; INTRACAUDAL; PERINEURAL AS NEEDED
Status: DISCONTINUED | OUTPATIENT
Start: 2023-11-14 | End: 2023-11-14

## 2023-11-14 RX ORDER — CHLORHEXIDINE GLUCONATE ORAL RINSE 1.2 MG/ML
15 SOLUTION DENTAL ONCE
Status: COMPLETED | OUTPATIENT
Start: 2023-11-14 | End: 2023-11-14

## 2023-11-14 RX ORDER — METFORMIN HYDROCHLORIDE 750 MG/1
750 TABLET, EXTENDED RELEASE ORAL
Qty: 90 TABLET | Refills: 1 | Status: SHIPPED | OUTPATIENT
Start: 2023-11-14

## 2023-11-14 RX ORDER — ACETAMINOPHEN 325 MG/1
650 TABLET ORAL EVERY 4 HOURS PRN
Status: DISCONTINUED | OUTPATIENT
Start: 2023-11-14 | End: 2023-11-14 | Stop reason: HOSPADM

## 2023-11-14 RX ORDER — MEPERIDINE HYDROCHLORIDE 25 MG/ML
12.5 INJECTION INTRAMUSCULAR; INTRAVENOUS; SUBCUTANEOUS
Status: DISCONTINUED | OUTPATIENT
Start: 2023-11-14 | End: 2023-11-14 | Stop reason: HOSPADM

## 2023-11-14 RX ORDER — KETOROLAC TROMETHAMINE 30 MG/ML
INJECTION, SOLUTION INTRAMUSCULAR; INTRAVENOUS AS NEEDED
Status: DISCONTINUED | OUTPATIENT
Start: 2023-11-14 | End: 2023-11-14

## 2023-11-14 RX ORDER — ONDANSETRON 2 MG/ML
INJECTION INTRAMUSCULAR; INTRAVENOUS AS NEEDED
Status: DISCONTINUED | OUTPATIENT
Start: 2023-11-14 | End: 2023-11-14

## 2023-11-14 RX ORDER — PROPOFOL 10 MG/ML
INJECTION, EMULSION INTRAVENOUS AS NEEDED
Status: DISCONTINUED | OUTPATIENT
Start: 2023-11-14 | End: 2023-11-14

## 2023-11-14 RX ORDER — OXYCODONE HYDROCHLORIDE AND ACETAMINOPHEN 5; 325 MG/1; MG/1
1 TABLET ORAL EVERY 4 HOURS PRN
Qty: 10 TABLET | Refills: 0 | Status: SHIPPED | OUTPATIENT
Start: 2023-11-14

## 2023-11-14 RX ORDER — FENTANYL CITRATE/PF 50 MCG/ML
25 SYRINGE (ML) INJECTION
Status: DISCONTINUED | OUTPATIENT
Start: 2023-11-14 | End: 2023-11-14 | Stop reason: HOSPADM

## 2023-11-14 RX ORDER — SODIUM CHLORIDE, SODIUM LACTATE, POTASSIUM CHLORIDE, CALCIUM CHLORIDE 600; 310; 30; 20 MG/100ML; MG/100ML; MG/100ML; MG/100ML
50 INJECTION, SOLUTION INTRAVENOUS CONTINUOUS
Status: DISCONTINUED | OUTPATIENT
Start: 2023-11-14 | End: 2023-11-14 | Stop reason: HOSPADM

## 2023-11-14 RX ORDER — ONDANSETRON 2 MG/ML
4 INJECTION INTRAMUSCULAR; INTRAVENOUS ONCE AS NEEDED
Status: DISCONTINUED | OUTPATIENT
Start: 2023-11-14 | End: 2023-11-14 | Stop reason: HOSPADM

## 2023-11-14 RX ORDER — FENTANYL CITRATE 50 UG/ML
INJECTION, SOLUTION INTRAMUSCULAR; INTRAVENOUS AS NEEDED
Status: DISCONTINUED | OUTPATIENT
Start: 2023-11-14 | End: 2023-11-14

## 2023-11-14 RX ORDER — CEFAZOLIN SODIUM 1 G/3ML
INJECTION, POWDER, FOR SOLUTION INTRAMUSCULAR; INTRAVENOUS AS NEEDED
Status: DISCONTINUED | OUTPATIENT
Start: 2023-11-14 | End: 2023-11-14

## 2023-11-14 RX ORDER — LIDOCAINE HYDROCHLORIDE 10 MG/ML
0.5 INJECTION, SOLUTION EPIDURAL; INFILTRATION; INTRACAUDAL; PERINEURAL ONCE AS NEEDED
Status: DISCONTINUED | OUTPATIENT
Start: 2023-11-14 | End: 2023-11-14 | Stop reason: HOSPADM

## 2023-11-14 RX ADMIN — ONDANSETRON 4 MG: 2 INJECTION INTRAMUSCULAR; INTRAVENOUS at 11:15

## 2023-11-14 RX ADMIN — FENTANYL CITRATE 25 MCG: 50 INJECTION INTRAMUSCULAR; INTRAVENOUS at 10:13

## 2023-11-14 RX ADMIN — SODIUM CHLORIDE, SODIUM LACTATE, POTASSIUM CHLORIDE, AND CALCIUM CHLORIDE: .6; .31; .03; .02 INJECTION, SOLUTION INTRAVENOUS at 10:50

## 2023-11-14 RX ADMIN — MIDAZOLAM 2 MG: 1 INJECTION INTRAMUSCULAR; INTRAVENOUS at 10:02

## 2023-11-14 RX ADMIN — CEFAZOLIN 2000 MG: 1 INJECTION, POWDER, FOR SOLUTION INTRAMUSCULAR; INTRAVENOUS at 10:08

## 2023-11-14 RX ADMIN — CHLORHEXIDINE GLUCONATE 15 ML: 1.2 RINSE ORAL at 08:24

## 2023-11-14 RX ADMIN — LIDOCAINE HYDROCHLORIDE 100 MG: 20 INJECTION, SOLUTION EPIDURAL; INFILTRATION; INTRACAUDAL at 10:10

## 2023-11-14 RX ADMIN — SODIUM CHLORIDE, SODIUM LACTATE, POTASSIUM CHLORIDE, AND CALCIUM CHLORIDE 50 ML/HR: .6; .31; .03; .02 INJECTION, SOLUTION INTRAVENOUS at 08:20

## 2023-11-14 RX ADMIN — PROPOFOL 200 MG: 10 INJECTION, EMULSION INTRAVENOUS at 10:10

## 2023-11-14 RX ADMIN — KETOROLAC TROMETHAMINE 30 MG: 30 INJECTION, SOLUTION INTRAMUSCULAR at 11:15

## 2023-11-14 RX ADMIN — FENTANYL CITRATE 25 MCG: 50 INJECTION INTRAMUSCULAR; INTRAVENOUS at 11:15

## 2023-11-14 NOTE — DISCHARGE INSTR - AVS FIRST PAGE
Discharge Instructions - Podiatry    Weight Bearing Status: Non-weightbearing to left foot with surgical shoe and crutches. Pain: Continue analgesics as directed    Follow-up appointment instructions: Please make an appointment within one week of discharge with Dr. Carmina Leone. Contact sooner if any increase in pain, or signs of infection occur    Patient Wound Care Instructions: Leave dressings clean, dry, and intact until 1st outpatient office visit.

## 2023-11-14 NOTE — ANESTHESIA POSTPROCEDURE EVALUATION
Post-Op Assessment Note    CV Status:  Stable    Pain management: adequate       Mental Status:  Alert and awake   Hydration Status:  Euvolemic   PONV Controlled:  Controlled   Airway Patency:  Patent     Post Op Vitals Reviewed: Yes    No anethesia notable event occurred.     Staff: Anesthesiologist             /71   Pulse 78   Temp (!) 97.4 °F (36.3 °C) (Temporal)   Resp 15   Wt 90.1 kg (198 lb 10.2 oz)   SpO2 98%   BMI 34.10 kg/m²

## 2023-11-14 NOTE — OP NOTE
OPERATIVE REPORT - Podiatry  PATIENT NAME: Kathia Theodore    :  1975  MRN: [id number]  Pt Location: [facility] OR ROOM 02    SURGERY DATE: 2023    Surgeon(s) and Role: Angy Muhammad DPM - Primary     * Payton Tovar DPM - Assisting    Pre-op Diagnosis:  Hallux valgus, acquired, left [M20.12]    Post-Op Diagnosis Codes:     * Hallux valgus, acquired, left [M20.12]    Procedure(s) (LRB):  Lapiplasty style bunionectomy of the left foot w/ plate & screw fixation (Left)    Specimen(s):  * No specimens in log *    Estimated Blood Loss:   5 mL    Drains:  * No LDAs found *    Anesthesia Type:   General with 20 ml of 0.5% Bupivacaine and 1% Lidocaine in a 1:1 mixture    Hemostasis:  - Atraumatic technique   - Manual compression  - Pneumatic ankle tourniquet   - Electrocautery     Materials:  Implant Name Type Inv. Item Serial No.  Lot No. LRB No. Used Action   IMPLANT ANATOMIC BIPLANAR LAPIPLASTY SYS 1 - [id number]  IMPLANT ANATOMIC BIPLANAR LAPIPLASTY SYS 1  [facility] [employer] [id number] Left 1 Implanted   SCREW LCK 2.7 X 16MM HIGH PITCH FASTPITCH - [id number]  SCREW LCK 2.7 X 16MM HIGH PITCH PÉREZ [facility] [employer] [id number] Left 1 Implanted   SCREW INTERFRAG 3MM LIPIPLASTY - [id number]  SCREW INTERFRAG 3MM LIPIPLASTY  [address] [place] [address] [id number] Left 1 Implanted     Operative Findings:  Consistent with diagnosis     Complications:   None    Procedure and Technique:     Under mild sedation, the patient was brought into the operating room and placed on the operating room table in the supine position. IV sedation was achieved by anesthesia team and a universal timeout was performed where all parties are in agreement of correct patient, correct procedure and correct site. A pneumatic tourniquet was then placed over the patient's left lower extremity with ample padding.  A Bryson block was performed consisting of 20 ml of 0.5% Bupivacaine and 1% Lidocaine in a 1:1 mixture. The foot was then prepped and draped in the usual aseptic manner. An esmarch bandage was used to exsangunate the foot and the pneumatic tourniquet was then inflated to 250 mmHg. Attention was directed to the left foot where linear incision was made medial to to the extensor tendon overlying the 1st TMTJ. Dissection was continued through the subcutaneous layer, bleeders were cauterized as needed. The extensor tendon was then retracted laterally out of the surgical field and any neurovascular structures were retracted medially out of the surgical field. Attention was then directed to the first metatarsal-medial cuneiform joint where an incision was carried down to bone reflecting all vital neural and vascular structures. The periosteum was longitudinally incised and reflected away medially and laterally from the underlying first tarsometatarsal joint. After obtaining adequate exposure a the joint was planed with the sagittal saw. An osteotome was used to release any remaining capsule and plantar attachments. Using a joystick pin, a trial manual reduction of the intermetatarsal angle and planal rotation was performed noting excellent motion and position. Attention was directed to the 1st MPJ where a small incision was made just lateral to the joint. Via blunt means dissection was directed to the first interspace where a lateral release was made with a #15 blade and scissors, releasing fibular sesamoid ligament and adductor tendon; the toe was then in a more normal rectus position following lateral release. Next using appropriate technique the Lapiplasty fulcrum was placed at the base of the 1st and 2nd metatarsals and the lapiplasty positioner was placed at the medial 1st metatarsal to the 2nd metatarsal laterally. The positioner was tightened with 1st metatarsal in desired frontal plane motion.  Position was confirmed on c-arm with noted reduction in IM angle, no metatarsus elevatus, and excellent position of sesamoids. Next using a Lapiplasty cut guide, the cartilage from the 1st metatarsal base and the distal medial cuniform was removed with a sagittal saw via Lapiplasty technique to assure even coaptation. Multiple areas of aggressive bone fenestration was performed at the metatarsal and cuneiform. Next the joint was compressed with excellent compression as confirmed by c-arm. One temporary threaded olive served as compression across the joint line. Next using proper Lapiplasty technique, one plate with 4 screws was implanted on the medial aspecit of the 1st metatarsal-medical cuneiform joint and one plate with 4 screws was implanted on the dorsal-lateral aspect of the 1st metatarsal-medical cuneiform joint with care to avoid adjacent joints. Correction and postioning of hardware confirmed on c-arm and noted to be excellent. The temporary olive wire pin was removed and replaced with a guide pin for the interfrag screw. The screw (see implants above) was then inserted over the guidewire across the joint line. Correction and postioning of of the screw was confirmed on c-arm and noted to be excellent. Attention was directed back to the 1st MTPJ where a small incision was made along the medial aspect of the 1st metatarsal head. Dissection was then carried down to capsule and a linear incision was made in the medial aspect of 1st MTPJ joint capsule to expose the hypertrophied medial eminence. This was then resected with sagittal saw and passed off to the back table. The surgical incision was irrigated with copious amounts of normal sterile saline. The periosteal and capsular structures were reapproximated using 3-0 Vicryl. Subcutaneous closure was obtained utilizing 4-0 Vicryl. Skin edges were reapproximated and closure was obtained utilizing 4-0  Prolene. The foot was then cleansed and dried. The incision site was dressed with Xeroform and a dry sterile dressing.  Posterior splint applied in operating room. The tourniquet was deflated at approximately 63 min and normal hyperemic response was noted to all digits. The patient tolerated the procedure and anesthesia well without immediate complications and transferred to PACU with vital signs stable. Dr. Corona Larsen was present during the entire procedure and participated in all key aspects. SIGNATURE: Hira Sales DPM  DATE: November 14, 2023  TIME: 11:36 AM      Portions of the record may have been created with voice recognition software. Occasional wrong word or "sound a like" substitutions may have occurred due to the inherent limitations of voice recognition software. Read the chart carefully and recognize, using context, where substitutions have occurred.

## 2023-11-14 NOTE — INTERVAL H&P NOTE
H&P reviewed. After examining the patient I find no changes in the patients condition since the H&P had been written.     Vitals:    11/14/23 0834   BP: 136/80   Pulse: 73   Resp: 16   Temp: (!) 97.2 °F (36.2 °C)   SpO2: 97%

## 2023-11-14 NOTE — ANESTHESIA PREPROCEDURE EVALUATION
Procedure:  Lapiplasty style bunionectomy of the left foot w/ plate & screw fixation (Left: Foot)    Relevant Problems   CARDIO   (+) Essential hypertension      NEURO/PSYCH   (+) Depressive disorder   (+) ROMULO (generalized anxiety disorder)      Other   (+) Attention deficit hyperactivity disorder   (+) History of PCOS   (+) History of diabetes mellitus, type II   (+) S/P hysterectomy        Physical Exam    Airway    Mallampati score: II         Dental       Cardiovascular  Rhythm: regular    Pulmonary   Breath sounds clear to auscultation    Other Findings        Anesthesia Plan  ASA Score- 2     Anesthesia Type- general with ASA Monitors. Additional Monitors:     Airway Plan:            Plan Factors-    Chart reviewed. Existing labs reviewed. Patient summary reviewed. Patient is not a current smoker. Obstructive sleep apnea risk education given perioperatively. Induction- intravenous. Postoperative Plan-     Informed Consent- Anesthetic plan and risks discussed with patient.

## 2023-11-14 NOTE — DISCHARGE SUMMARY
Discharge Summary Outpatient Procedure Podiatry -   Miki Weinstein 51 y.o. female MRN: [id number]  Unit/Bed#: OR POOL Encounter: [id number]    Admission Date: 11/14/2023     Admitting Diagnosis: Hallux valgus, acquired, left [M20.12]    Discharge Diagnosis: same    Procedures Performed: Lapiplasty style bunionectomy of the left foot w/ plate & screw fixation: 35945 (CPT®)    Complications: none    Condition at Discharge: stable    Discharge instructions/Information to patient and family:   See after visit summary for information provided to patient and family. Provisions for Follow-Up Care/Important appointments:  See after visit summary for information related to follow-up care and any pertinent home health orders. Discharge Medications:  See after visit summary for reconciled discharge medications provided to patient and family.

## 2023-12-15 ENCOUNTER — TELEPHONE (OUTPATIENT)
Age: 48
End: 2023-12-15

## 2023-12-16 ENCOUNTER — APPOINTMENT (OUTPATIENT)
Dept: LAB | Facility: MEDICAL CENTER | Age: 48
End: 2023-12-16
Payer: COMMERCIAL

## 2023-12-16 DIAGNOSIS — Z01.818 PREOP EXAMINATION: ICD-10-CM

## 2023-12-16 DIAGNOSIS — I10 ESSENTIAL HYPERTENSION: ICD-10-CM

## 2023-12-16 DIAGNOSIS — Z86.39 HISTORY OF DIABETES MELLITUS, TYPE II: ICD-10-CM

## 2023-12-16 DIAGNOSIS — E66.9 NON MORBID OBESITY: ICD-10-CM

## 2023-12-16 DIAGNOSIS — E11.9 TYPE II DIABETES MELLITUS, WELL CONTROLLED (HCC): ICD-10-CM

## 2023-12-16 DIAGNOSIS — Z01.818 PRE-OP TESTING: ICD-10-CM

## 2023-12-16 LAB
ALBUMIN SERPL BCP-MCNC: 4.4 G/DL (ref 3.5–5)
ALP SERPL-CCNC: 34 U/L (ref 34–104)
ALT SERPL W P-5'-P-CCNC: 16 U/L (ref 7–52)
ANION GAP SERPL CALCULATED.3IONS-SCNC: 7 MMOL/L
AST SERPL W P-5'-P-CCNC: 21 U/L (ref 13–39)
BASOPHILS # BLD AUTO: 0.06 THOUSANDS/ÂΜL (ref 0–0.1)
BASOPHILS NFR BLD AUTO: 1 % (ref 0–1)
BILIRUB SERPL-MCNC: 0.61 MG/DL (ref 0.2–1)
BUN SERPL-MCNC: 17 MG/DL (ref 5–25)
CALCIUM SERPL-MCNC: 9.8 MG/DL (ref 8.4–10.2)
CHLORIDE SERPL-SCNC: 102 MMOL/L (ref 96–108)
CHOLEST SERPL-MCNC: 206 MG/DL
CO2 SERPL-SCNC: 31 MMOL/L (ref 21–32)
CREAT SERPL-MCNC: 0.74 MG/DL (ref 0.6–1.3)
EOSINOPHIL # BLD AUTO: 0.31 THOUSAND/ÂΜL (ref 0–0.61)
EOSINOPHIL NFR BLD AUTO: 6 % (ref 0–6)
ERYTHROCYTE [DISTWIDTH] IN BLOOD BY AUTOMATED COUNT: 12.7 % (ref 11.6–15.1)
EST. AVERAGE GLUCOSE BLD GHB EST-MCNC: 111 MG/DL
GFR SERPL CREATININE-BSD FRML MDRD: 95 ML/MIN/1.73SQ M
GLUCOSE P FAST SERPL-MCNC: 97 MG/DL (ref 65–99)
HBA1C MFR BLD: 5.5 %
HCT VFR BLD AUTO: 44 % (ref 34.8–46.1)
HDLC SERPL-MCNC: 76 MG/DL
HGB BLD-MCNC: 14.3 G/DL (ref 11.5–15.4)
IMM GRANULOCYTES # BLD AUTO: 0.01 THOUSAND/UL (ref 0–0.2)
IMM GRANULOCYTES NFR BLD AUTO: 0 % (ref 0–2)
LDLC SERPL CALC-MCNC: 111 MG/DL (ref 0–100)
LYMPHOCYTES # BLD AUTO: 1.38 THOUSANDS/ÂΜL (ref 0.6–4.47)
LYMPHOCYTES NFR BLD AUTO: 25 % (ref 14–44)
MCH RBC QN AUTO: 30.2 PG (ref 26.8–34.3)
MCHC RBC AUTO-ENTMCNC: 32.5 G/DL (ref 31.4–37.4)
MCV RBC AUTO: 93 FL (ref 82–98)
MONOCYTES # BLD AUTO: 0.4 THOUSAND/ÂΜL (ref 0.17–1.22)
MONOCYTES NFR BLD AUTO: 7 % (ref 4–12)
NEUTROPHILS # BLD AUTO: 3.27 THOUSANDS/ÂΜL (ref 1.85–7.62)
NEUTS SEG NFR BLD AUTO: 61 % (ref 43–75)
NRBC BLD AUTO-RTO: 0 /100 WBCS
PLATELET # BLD AUTO: 323 THOUSANDS/UL (ref 149–390)
PMV BLD AUTO: 10.4 FL (ref 8.9–12.7)
POTASSIUM SERPL-SCNC: 4.4 MMOL/L (ref 3.5–5.3)
PROT SERPL-MCNC: 7 G/DL (ref 6.4–8.4)
RBC # BLD AUTO: 4.73 MILLION/UL (ref 3.81–5.12)
SODIUM SERPL-SCNC: 140 MMOL/L (ref 135–147)
TRIGL SERPL-MCNC: 97 MG/DL
TSH SERPL DL<=0.05 MIU/L-ACNC: 1.82 UIU/ML (ref 0.45–4.5)
WBC # BLD AUTO: 5.43 THOUSAND/UL (ref 4.31–10.16)

## 2023-12-16 PROCEDURE — 36415 COLL VENOUS BLD VENIPUNCTURE: CPT

## 2023-12-16 PROCEDURE — 85025 COMPLETE CBC W/AUTO DIFF WBC: CPT

## 2023-12-16 PROCEDURE — 84443 ASSAY THYROID STIM HORMONE: CPT

## 2023-12-16 PROCEDURE — 83036 HEMOGLOBIN GLYCOSYLATED A1C: CPT

## 2023-12-16 PROCEDURE — 80053 COMPREHEN METABOLIC PANEL: CPT

## 2023-12-16 PROCEDURE — 80061 LIPID PANEL: CPT

## 2023-12-20 ENCOUNTER — HOSPITAL ENCOUNTER (OUTPATIENT)
Dept: MAMMOGRAPHY | Facility: HOSPITAL | Age: 48
Discharge: HOME | End: 2023-12-20
Payer: COMMERCIAL

## 2023-12-20 VITALS — BODY MASS INDEX: 33.91 KG/M2 | HEIGHT: 64 IN | WEIGHT: 198.64 LBS

## 2023-12-20 DIAGNOSIS — Z12.31 ENCOUNTER FOR SCREENING MAMMOGRAM FOR BREAST CANCER: ICD-10-CM

## 2023-12-20 PROCEDURE — 77063 BREAST TOMOSYNTHESIS BI: CPT

## 2023-12-20 PROCEDURE — 77067 SCR MAMMO BI INCL CAD: CPT

## 2023-12-21 DIAGNOSIS — I10 ESSENTIAL HYPERTENSION: ICD-10-CM

## 2023-12-21 RX ORDER — HYDROCHLOROTHIAZIDE 12.5 MG/1
12.5 TABLET ORAL DAILY
Qty: 30 TABLET | Refills: 0 | Status: SHIPPED | OUTPATIENT
Start: 2023-12-21

## 2024-01-03 DIAGNOSIS — I10 ESSENTIAL HYPERTENSION: ICD-10-CM

## 2024-01-03 RX ORDER — AMLODIPINE BESYLATE 5 MG/1
5 TABLET ORAL DAILY
Qty: 90 TABLET | Refills: 1 | Status: SHIPPED | OUTPATIENT
Start: 2024-01-03

## 2024-06-24 ENCOUNTER — NURSE TRIAGE (OUTPATIENT)
Age: 49
End: 2024-06-24

## 2024-06-24 NOTE — TELEPHONE ENCOUNTER
"Pt calling.  She was [place] last [place] for her son's graduation when she hit her head on the kitchen cabinet in her [employer]B.  She was bending down do give her dog a carrot and when she stood up her head hit the corner of the cabinet causing a laceration.  She denies any LOC. She went to Urgent Care, the laceration did not require stiches, her Tdap was updated.  She is taking ibuprofen and tylenol for pain.  Today is the first day she did not take anything and she noticed a 2/10 HA. She also has some nausea.  She is alert and oriented and denies any other symptoms.  Appt made for tomorrow in office per protocol.     Reason for Disposition   After 3 days and headache persists    Answer Assessment - Initial Assessment Questions  1. MECHANISM: \"How did the injury happen?\" For falls, ask: \"What height did you fall from?\" and \"What surface did you fall against?\"       I was bending down to give my dog a carrot and when I stood up I hit my head on the corner of the cabinet  2. ONSET: \"When did the injury happen?\" (Minutes or hours ago)       06/18/2024  3. NEUROLOGIC SYMPTOMS: \"Was there any loss of consciousness?\" \"Are there any other neurological symptoms?\"       2/10 HA  4. MENTAL STATUS: \"Does the person know who he is, who you are, and where he is?\"       Alert and oriented x 4   5. LOCATION: \"What part of the head was hit?\"       Scalp  6. SCALP APPEARANCE: \"What does the scalp look like? Is it bleeding now?\" If Yes, ask: \"Is it difficult to stop?\"       It was bleeding   7. SIZE: For cuts, bruises, or swelling, ask: \"How large is it?\" (e.g., inches or centimeters)       no  8. PAIN: \"Is there any pain?\" If Yes, ask: \"How bad is it?\"  (e.g., Scale 1-10; or mild, moderate, severe)      Yes mild   9. TETANUS: For any breaks in the skin, ask: \"When was the last tetanus booster?\"      Updated at urgent care   10. OTHER SYMPTOMS: \"Do you have any other symptoms?\" (e.g., neck pain, vomiting)        Nausea   11. " "PREGNANCY: \"Is there any chance you are pregnant?\" \"When was your last menstrual period?\"        no    Protocols used: Head Injury-ADULT-[place]    "

## 2024-06-25 ENCOUNTER — EVALUATION (OUTPATIENT)
Age: 49
End: 2024-06-25
Payer: COMMERCIAL

## 2024-06-25 ENCOUNTER — OFFICE VISIT (OUTPATIENT)
Dept: INTERNAL MEDICINE CLINIC | Facility: CLINIC | Age: 49
End: 2024-06-25
Payer: COMMERCIAL

## 2024-06-25 ENCOUNTER — TELEPHONE (OUTPATIENT)
Dept: OBGYN CLINIC | Facility: HOSPITAL | Age: 49
End: 2024-06-25

## 2024-06-25 VITALS
BODY MASS INDEX: 34.66 KG/M2 | OXYGEN SATURATION: 99 % | WEIGHT: 203 LBS | HEART RATE: 76 BPM | DIASTOLIC BLOOD PRESSURE: 74 MMHG | HEIGHT: 64 IN | SYSTOLIC BLOOD PRESSURE: 136 MMHG

## 2024-06-25 DIAGNOSIS — S06.0X0A CONCUSSION WITHOUT LOSS OF CONSCIOUSNESS, INITIAL ENCOUNTER: Primary | ICD-10-CM

## 2024-06-25 DIAGNOSIS — S06.0X0A CONCUSSION WITHOUT LOSS OF CONSCIOUSNESS, INITIAL ENCOUNTER: ICD-10-CM

## 2024-06-25 DIAGNOSIS — I10 ESSENTIAL HYPERTENSION: Primary | ICD-10-CM

## 2024-06-25 PROCEDURE — 97140 MANUAL THERAPY 1/> REGIONS: CPT

## 2024-06-25 PROCEDURE — 97530 THERAPEUTIC ACTIVITIES: CPT

## 2024-06-25 PROCEDURE — 97161 PT EVAL LOW COMPLEX 20 MIN: CPT

## 2024-06-25 PROCEDURE — 99213 OFFICE O/P EST LOW 20 MIN: CPT | Performed by: INTERNAL MEDICINE

## 2024-06-25 NOTE — TELEPHONE ENCOUNTER
Caller: Patient    Doctor/Office: PT    Call regarding :  Concussion appt     Call was transferred to: PT

## 2024-06-25 NOTE — ASSESSMENT & PLAN NOTE
-Patient appears to have suffered a mild concussion.  A referral has been made to our comprehensive concussion program for further treatment.

## 2024-06-25 NOTE — PROGRESS NOTES
PT Evaluation          POC expires Unit limit Auth Expiration date PT/OT + Visit Limit? Co-Insurance   24 N/a pend 90 PCY PT+OT+ST Yes                               Visit/Unit Tracking  AUTH Status:  Date               pend Used                Remaining                           Today's date: 2024  Patient name: Paula Rossi  : 1975  MRN: [id number]  Referring provider: Brad Orta,*  Dx:   Encounter Diagnosis     ICD-10-CM    1. Concussion without loss of consciousness, initial encounter  S06.0X0A Ambulatory Referral to Comprehensive Concussion Program              Assessment  Assessment details: Patient is a 49 y.o. Female who presents to skilled outpatient PT with complaints of HA and nausea 2/2 diagnosed concussion. On 24, patient reports bending over to provide the dog a treat, and hit her head on the corner of a cabinet resulting in + laceration (did not require stitches) and - LOC. Currently patient is 1 week out from onset of injury. Unable to assess oculomotor system secondary to current research indicating testing delayed for 2 weeks out for adults. Education provided with common symptoms you will see with concussion which include post-tramatic headache, dizziness and neck pain. Upon-eval patient displays impaired C spine range of motion + pain/tightness and noted constant 2/10 headaches. Hypertonicity and trigger points noted R>L, with UT most impacted. Provided basic HEP of cervical stretches and TPR. Handout of concussion rehab provided. Patient verbalized understanding. Patient will be seen during 2nd week of concussion for follow up oculomotor, dizziness, and balance testing.       Patient verbalized understanding of POC.    Please contact me if you have any questions or recommendations. Thank you for the referral and the opportunity to share in Paula Rossi's care.      Cut off score   All date  taken from [facility] Neuro Section or Rehab Measures    DGI:  MDC for Vestibular Disorders: 4 points  MDC for Geriatrics/Community Dwelling Older Adults: 3 Points  Falls risk cut off: <19/24    FGA:  MCID: 4 points  Geriatrics/Community Dwelling Older Adults: </= 22/30 fall risk  Geriatrics/Community Dwelling Older Adults: </= 20/30 unexplained falls in the next 6 months  Parkinsons: </= 18/30 fall risk    mCTSIB (normed on ages 20-60, lower number is less sway or better static balance)  Eyes open firm surface (norm 0.21-0.48)  Eyes closed firm surface (norm 0.48-0.99)  Eyes open foam surface (norm 0.38-0.71)  Eyes closed foam surface (norm 0.70-2.22)    Spontaneous Recovery  - < 14 = up to 4 weeks  - 14-16 = 2-4 weeks depending on symptoms  - > 16 = up to 2 weeks        Impairments: Abnormal coordination, Abnormal gait, Abnormal muscle tone, Abnormal or restricted ROM, Activity intolerance, Impaired balance, Impaired physical strength, Lacks appropriate HEP, Poor posture, Poor body mechanics, Pain with function, Safety issue, Weight-bearing intolerance, Abnormal movement, Difficulty understanding, Abnormal muscle firing  Understanding of Dx/Px/POC: Good  Prognosis: Good      Goals    Concussion Short Term Goals (4 weeks):  - Patient will display improved cervical spine STM by 50% to encourage improved AROM during functional tasks  - Patient will be independent with simple HEP  - Patient will tolerate 60 seconds of oculomotor exercises with minimal increase in symptoms  - Patient will demonstrate 10% decrease in symptom severity scoring with independent use of modalities  - Patient will demonstrate improved soft tissue density t/o cervical region with independent self-release  - Patient will be able to tolerate 30 seconds with eyes closed on foam surface without any loss of balance demonstrating improvement in vestibular system  - Patient will improve FGA score by 4 points per MDC to promote improved safety with dynamic  tasks  - Patient will report HA symptoms lasting </= 50% of patient's awake hours to promote overall symptom reduction  - Patient will report HA </= 5 days per week  - Patient will report average HA intensity of 5/10 or less    Concussion Long Term Goals (12 weeks):  - Patient will display decreased forward head and rounded shoulders to promote improved resting posture and cervical mobility  - Patient will be independent with complex HEP  - Patient will tolerate >=2 minutes of oculomotor exercises to facilitate return to reading and computer work  - Patient will report >= 50% improvement on symptom severity scoring  - Patient will demonstrate ability to perform HT in gait without veering  - Patient will be able to perform 15 minutes of aerobic activity at HR 70% max to facilitate return to sport/normal functional tasks  - Patient will demonstrate normalized soft tissue t/o  - Patient will score low risk for falls with FGA test with score of 23/30 or higher per current research data  - Patient will report baseline dizziness of 1/10 or less   - Patient will report 2/10 dizziness or less with visual stimulating surround with duration of 2 minutes   - Patient will report subjective improvement to 90% or higher to promote return to PLOF  - Patient will complete work related tasks without exacerbation of symptoms in order to maximize function and promote return to work  - Patient will report HA symptoms lasting </= 25% of patient's awake hours to promote overall symptom reduction  - Patient will report HA </= 3 days per week  - Patient will report average HA intensity of 3/10 or less        Plan  Plan details:   Patient would benefit from: Skilled PT  Planned modality interventions: Biofeedback, Cryotherapy, TENS, Thermotherapy  Planned therapy interventions: Abdominal trunk stabilization, ADL training, Balance, Balance/WB training, Breathing training, Body mechanics training, Coordination, Functional ROM exercises, Gait  "training, HEP, Joint Mobilization, Manual Therapy, Mix taping, Motor coordination training, Neuromuscular re-education, Patient education, Postural training, Strengthening, Stretching, Therapeutic activities, Therapeutic exercises, Therapeutic training, Transfer training, Activity modification, Work reintegration  Frequency: 1-3x/wk  Duration in weeks: 12 weeks  Plan of Care beginning date: 24  Plan of Care expiration date: 3 months - 2024  Treatment plan discussed with: Patient        Subjective Evaluation    History of Present Illness  Mechanism of injury: patient reports bending over to provide the dog a treat, and hit her head on the corner of a cabinet resulting in + laceration (did not require stitches) and - LOC on 24 (1 week ago). Has been taking pain medication since onset, minus yesterday (where she experienced HA + nausea). Continues to experience nausea at start of session. Complains of R neck pain.    Patient goal:   Return to PLOF    Concussion Subjective  - Mechanism of concussion: Struck by object  - Concurrent injury: Yes, describe: laceration  - Date of injury: 2024  - Bear/retrograde amnesia: No  - Initial symptoms: Headache, Dizziness, Nausea, Fatigue, and Changes in mood  - History of prior concussion: No  - Imaging: No  - Occupation:     [employer]  - Numbness: No  - Tingling: No  - Weakness: No  - Unilateral hearing loss: No  - Slurred speech: No  - Progressive hearing loss: No  - Tremors: No  - Poor coordination: No  - UMN signs: No  - LoC: No  - Rigidity: No  - Visual field loss: No  - Memory loss: No  - CN dysfunction: No  - Vertical nystagmus: No    Dizziness Subjective  - How long does dizziness last: seconds  - How would you describe the dizziness: \"off balance\"   - Rolling in bed: No  - Supine to/from sit: No      Pain  Current pain ratin/10  At best pain ratin/10  At worst pain ratin/10  Location: R neck pain  Aggravating factors: " palpation    Social Support  - Steps to enter house: 4 KURTIS from front  - Stairs in house: 12 stairs   - Lives in: H  - Lives with: dog    - Employment status:     Treatments  - Previous treatment: ortho PT  - Current treatment: neuro PT  - Diagnostic Testing: n/a      Objective     Concussion/Dizziness Objective  MGHA Questions:  - Frequency: constant   - Intensity: 2/10  - Location: temporal   - Sensation: pulsating/throbbing    - Exacerbating Factors: screen time  - Relieving Factors:  medication    Coordination Screen  - Dysmetria: WFL  - Dysdiadochokinesia: WFL  - Alternating Toe Taps: WFL      Cervical Spine AROM  - Flexion: WFL, Pain at end range  - Extension: WFL, No pain + tightness  - R Rotation: WFL, Pain throughout range  - L Rotation: Minimal limitation, Pain throughout range  - R Lateral Flexion: WFL, No pain + tightness  - L Lateral Flexion: Minimal limitation, Pain throughout range    Palpation  - Hypertonic Muscles: R Upper Trapezius, R Anterior Scalenes, R Posterior Scalenes, R Levator Scapulae, Suboccipitals, R SCM, L SCM, R Rhomboids, and L Rhomboids  - Trigger Points: R Upper Trapezius, R Anterior Scalenes, R Levator Scapulae, Suboccipitals, R SCM, L SCM, and L Rhomboids    Integrity Testing  - mVBI: WFL  - Sharp Carina: intact  - Alar Ligament Stability Test: intact    Oculomotor Screen (20 reps or 30 sec)   Oculomotor Screen held due to patient being 1 week out from injury per research should be conducted 2 weeks out.    Physiologic Stress Testing:  - Treadmill: held    Outcome Measures Initial Eval  6/25        mCTSIB  - FTEO (firm)  - FTEC (firm)  - FTEO (foam)  - FTEC (foam)   defer        FGA defer        IRIS defer        DHI defer        PSSS 12/22  24/132                                               Basic cervical HEP: TPR, stretches, and neuro-ed strengthening  Concussion recovery handout    Manual  Heat  TPR UT/LS    Precautions:   Past Medical History:   Diagnosis Date     Anxiety     Arthritis     Claustrophobia     Depression     History of PCOS     History of seizures as a child     fever related    Hypertension     Seasonal allergies     Wears reading eyeglasses

## 2024-06-25 NOTE — PATIENT INSTRUCTIONS
Your symptoms appear to be due to a concussion   A referral has been made to our concussion clinic for further treatment

## 2024-07-01 DIAGNOSIS — I10 ESSENTIAL HYPERTENSION: ICD-10-CM

## 2024-07-02 RX ORDER — AMLODIPINE BESYLATE 5 MG/1
5 TABLET ORAL DAILY
Qty: 90 TABLET | Refills: 3 | Status: SHIPPED | OUTPATIENT
Start: 2024-07-02

## 2024-07-22 ENCOUNTER — OFFICE VISIT (OUTPATIENT)
Dept: INTERNAL MEDICINE CLINIC | Facility: CLINIC | Age: 49
End: 2024-07-22
Payer: COMMERCIAL

## 2024-07-22 VITALS
BODY MASS INDEX: 34.56 KG/M2 | DIASTOLIC BLOOD PRESSURE: 78 MMHG | SYSTOLIC BLOOD PRESSURE: 134 MMHG | HEART RATE: 96 BPM | WEIGHT: 202.4 LBS | HEIGHT: 64 IN | OXYGEN SATURATION: 98 %

## 2024-07-22 DIAGNOSIS — Z86.39 HISTORY OF DIABETES MELLITUS, TYPE II: ICD-10-CM

## 2024-07-22 DIAGNOSIS — F32.A DEPRESSIVE DISORDER: ICD-10-CM

## 2024-07-22 DIAGNOSIS — Z00.00 ANNUAL PHYSICAL EXAM: Primary | ICD-10-CM

## 2024-07-22 DIAGNOSIS — I10 ESSENTIAL HYPERTENSION: ICD-10-CM

## 2024-07-22 DIAGNOSIS — Z12.11 COLON CANCER SCREENING: ICD-10-CM

## 2024-07-22 DIAGNOSIS — F41.1 GAD (GENERALIZED ANXIETY DISORDER): ICD-10-CM

## 2024-07-22 DIAGNOSIS — F90.9 ATTENTION DEFICIT HYPERACTIVITY DISORDER (ADHD), UNSPECIFIED ADHD TYPE: ICD-10-CM

## 2024-07-22 PROBLEM — S06.0X0A CONCUSSION WITH NO LOSS OF CONSCIOUSNESS: Status: RESOLVED | Noted: 2024-06-25 | Resolved: 2024-07-22

## 2024-07-22 PROCEDURE — 99396 PREV VISIT EST AGE 40-64: CPT | Performed by: INTERNAL MEDICINE

## 2024-07-22 RX ORDER — DULOXETIN HYDROCHLORIDE 20 MG/1
20 CAPSULE, DELAYED RELEASE ORAL DAILY
Qty: 90 CAPSULE | Refills: 1 | Status: SHIPPED | OUTPATIENT
Start: 2024-07-22

## 2024-07-22 NOTE — PROGRESS NOTES
Adult Annual Physical  Name: Paula Rossi      : 1975      MRN: [id number]  Encounter Provider: Brad Orta MD  Encounter Date: 2024   [facility]    Assessment & Plan   1. Annual physical exam  2. Essential hypertension  Assessment & Plan:  -Blood pressure well controlled  -Continue current antihypertensive regimen    Orders:  -     Basic metabolic panel; Future  3. Attention deficit hyperactivity disorder (ADHD), unspecified ADHD type  Assessment & Plan:  -Methylphenidate managed by psychiatry  4. Depressive disorder  Assessment & Plan:  -Mood stable on Cymbalta and Wellbutrin   -Continue f/u with Psychiatry   -A referral has been made to [place] for counseling   Orders:  -     Ambulatory referral to Psych Services; Future  5. ROMULO (generalized anxiety disorder)  -     Ambulatory referral to Psych Services; Future  6. History of diabetes mellitus, type II  Assessment & Plan:  -History of elevated blood sugars.  Continue metformin twice daily.  7. Colon cancer screening  -     Ambulatory Referral to Gastroenterology; Future    Immunizations and preventive care screenings were discussed with patient today. Appropriate education was printed on patient's after visit summary.         History of Present Illness     Adult Annual Physical  Patient presents today for annual physical.  Overall she states she feels well and has no major complaints.  She was seen 1 month ago after she struck her head for concussion symptoms.  She states she went for the initial evaluation at concussion clinic and over the past few weeks her symptoms have resolved.    Regarding her depression/anxiety she feels her mood is stable.  She notes that she is going through a rough patch right now as she is in the middle of a divorce and her son recently left home to go to the [employer].    Review of Systems  All other systems negative except for pertinent findings noted in HPI.      Past Medical History   Past Medical History:   Diagnosis Date   • Anxiety    • Arthritis    • Claustrophobia    • Concussion with no loss of consciousness 06/25/2024   • Depression    • History of PCOS    • History of seizures as a child     fever related   • Hypertension    • Seasonal allergies    • Wears reading eyeglasses      Past Surgical History:   Procedure Laterality Date   • CERVICAL CONE BIOPSY     • ESSURE TUBAL LIGATION     • HYSTERECTOMY  2016    age 41   • INCONTINENCE SURGERY      bladder sling   • OOPHORECTOMY Bilateral 2016    age 41   • PILONIDAL CYST / SINUS EXCISION     • VT ARTHRODESIS MIDTARSOMETATARSAL SINGLE JOINT Left 11/14/2023    Procedure: Lapiplasty style bunionectomy of the left foot w/ plate & screw fixation;  Surgeon: Apollo Antonio DPM;  Location: [facility];  Service: Podiatry   • VT CORRJ HLX VLGS BNCTY SESMDC JOINT ARTHRODESIS Right 05/17/2022    Procedure: 1st metatarsal cuneiform joint fusion-Lapiplasty procedure, modified Knox bunionectomy and second digit arthrodesis at proximal interphalangeal joint with capsular release at 2nd metatarsal phalangeal joint;  Surgeon: Shereen Hope DPM;  Location: [facility];  Service: Podiatry   • TONSILLECTOMY     • WRIST SURGERY Left      Family History   Problem Relation Age of Onset   • No Known Problems Mother    • No Known Problems Father    • No Known Problems Sister    • No Known Problems Sister    • Breast cancer Maternal Grandmother         50s   • Lung cancer Paternal Grandmother    • No Known Problems Paternal Grandfather    • Colon cancer Brother    • Stomach cancer Maternal Aunt    • No Known Problems Maternal Aunt    • No Known Problems Maternal Aunt    • No Known Problems Maternal Aunt    • Colon cancer Other    • No Known Problems Paternal Aunt    • Breast cancer additional onset Neg Hx    • BRCA2 Positive Neg Hx    • BRCA2 Negative Neg Hx    • BRCA1 Positive Neg Hx    • BRCA1 Negative Neg Hx    • BRCA 1/2 Neg Hx     • Endometrial cancer Neg Hx    • Ovarian cancer Neg Hx      Current Outpatient Medications on File Prior to Visit   Medication Sig Dispense Refill   • acetaminophen (TYLENOL) 325 mg tablet Take 650 mg by mouth every 6 (six) hours as needed for mild pain     • amLODIPine (NORVASC) 5 mg tablet Take 1 tablet (5 mg total) by mouth daily 90 tablet 3   • Ascorbic Acid (VITAMIN C PO) Take 1 tablet by mouth daily     • buPROPion (WELLBUTRIN SR) 200 MG 12 hr tablet Take 200 mg by mouth 2 (two) times a day     • COLLAGEN PO Take by mouth in the morning     • conjugated estrogens (PREMARIN) 0.625 mg tablet Take 0.625 mg by mouth in the morning. Take daily for 21 days then do not take for 7 days..     • fluticasone (FLONASE) 50 mcg/act nasal spray 2 sprays into each nostril if needed     • Gluc-Chonn-MSM-Boswellia-Vit D (GLUCOSAMINE CHONDROITIN + D3 PO) Take 2 tablets by mouth in the morning     • metFORMIN (GLUCOPHAGE-XR) 750 mg 24 hr tablet Take 1 tablet (750 mg total) by mouth daily with breakfast 90 tablet 1   • methylphenidate (CONCERTA) 54 MG ER tablet 54 mg 2 (two) times a day     • mometasone (ELOCON) 0.1 % ointment Apply topically daily (Patient taking differently: Apply topically if needed) 45 g 0   • multivitamin (THERAGRAN) TABS Take 1 tablet by mouth daily     • Tretinoin 0.05 % LOTN Apply topically if needed     • VITAMIN D PO Take 1 capsule by mouth daily     • [DISCONTINUED] CALCIUM PO Take 1 tablet by mouth in the morning     • [DISCONTINUED] DULoxetine (CYMBALTA) 30 mg delayed release capsule Take 30 mg by mouth daily     • [DISCONTINUED] hydrochlorothiazide (HYDRODIURIL) 12.5 mg tablet Take 1 tablet (12.5 mg total) by mouth daily 30 tablet 0   • [DISCONTINUED] oxyCODONE-acetaminophen (Percocet) 5-325 mg per tablet Take 1 tablet by mouth every 4 (four) hours as needed for moderate pain for up to 10 doses Max Daily Amount: 6 tablets 10 tablet 0     No current facility-administered medications on file prior to  "visit.   No Known Allergies     Objective     /78 (BP Location: Left arm, Patient Position: Sitting, Cuff Size: Large)   Pulse 96   Ht 5' 4\" (1.626 m)   Wt 91.8 kg (202 lb 6.4 oz)   SpO2 98%   BMI 34.74 kg/m²     Physical Exam  General: NAD   HEENT: NCAT, EOMI, normal conjunctiva  Cardiovascular: RRR, normal S1 and S2, no m/r/g  Pulmonary: Normal respiratory effort, no wheezes, rales or rhonchi  GI: Soft, nontender, nondistended, normoactive bowel sounds  Musculoskeletal: Normal bulk and tone  Neuro: Non-focal, ambulating without difficulty, non-antalgic gait  Extremities: Trace pitting LE edema   Skin: Normal skin color, no rashes   Psychiatric: Normal mood and affect      "

## 2024-07-22 NOTE — ASSESSMENT & PLAN NOTE
-Mood stable on Cymbalta and Wellbutrin   -Continue f/u with Psychiatry   -A referral has been made to [facility] for counseling

## 2024-07-22 NOTE — PATIENT INSTRUCTIONS
"-Consider a Xylitol/Saline nasal spray for dry nose (XLEAR)  -Blood work has been ordered          Patient Education     Routine physical for adults   The Basics   Written by the doctors and editors at [facility]   What is a physical? -- A physical is a routine visit, or \"check-up,\" with your doctor. You might also hear it called a \"wellness visit\" or \"preventive visit.\"  During each visit, the doctor will:   Ask about your physical and mental health   Ask about your habits, behaviors, and lifestyle   Do an exam   Give you vaccines if needed   Talk to you about any medicines you take   Give advice about your health   Answer your questions  Getting regular check-ups is an important part of taking care of your health. It can help your doctor find and treat any problems you have. But it's also important for preventing health problems.  A routine physical is different from a \"sick visit.\" A sick visit is when you see a doctor because of a health concern or problem. Since physicals are scheduled ahead of time, you can think about what you want to ask the doctor.  How often should I get a physical? -- It depends on your age and health. In general, for people age 21 years and older:   If you are younger than 50 years, you might be able to get a physical every 3 years.   If you are 50 years or older, your doctor might recommend a physical every year.  If you have an ongoing health condition, like diabetes or high blood pressure, your doctor will probably want to see you more often.  What happens during a physical? -- In general, each visit will include:   Physical exam - The doctor or nurse will check your height, weight, heart rate, and blood pressure. They will also look at your eyes and ears. They will ask about how you are feeling and whether you have any symptoms that bother you.   Medicines - It's a good idea to bring a list of all the medicines you take to each doctor visit. Your doctor will talk to you about your " "medicines and answer any questions. Tell them if you are having any side effects that bother you. You should also tell them if you are having trouble paying for any of your medicines.   Habits and behaviors - This includes:   Your diet   Your exercise habits   Whether you smoke, drink alcohol, or use drugs   Whether you are sexually active   Whether you feel safe at home  Your doctor will talk to you about things you can do to improve your health and lower your risk of health problems. They will also offer help and support. For example, if you want to quit smoking, they can give you advice and might prescribe medicines. If you want to improve your diet or get more physical activity, they can help you with this, too.   Lab tests, if needed - The tests you get will depend on your age and situation. For example, your doctor might want to check your:   Cholesterol   Blood sugar   Iron level   Vaccines - The recommended vaccines will depend on your age, health, and what vaccines you already had. Vaccines are very important because they can prevent certain serious or deadly infections.   Discussion of screening - \"Screening\" means checking for diseases or other health problems before they cause symptoms. Your doctor can recommend screening based on your age, risk, and preferences. This might include tests to check for:   Cancer, such as breast, prostate, cervical, ovarian, colorectal, prostate, lung, or skin cancer   Sexually transmitted infections, such as chlamydia and gonorrhea   Mental health conditions like depression and anxiety  Your doctor will talk to you about the different types of screening tests. They can help you decide which screenings to have. They can also explain what the results might mean.   Answering questions - The physical is a good time to ask the doctor or nurse questions about your health. If needed, they can refer you to other doctors or specialists, too.  Adults older than 65 years often need " other care, too. As you get older, your doctor will talk to you about:   How to prevent falling at home   Hearing or vision tests   Memory testing   How to take your medicines safely   Making sure that you have the help and support you need at home  All topics are updated as new evidence becomes available and our peer review process is complete.  This topic retrieved from [employer] on: May 02, 2024.  Topic 317632 Version 1.0  Release: 32.4.3 - C32.122  © 2024 UpToDate, Inc. and/or its affiliates. All rights reserved.  Consumer Information Use and Disclaimer   Disclaimer: This generalized information is a limited summary of diagnosis, treatment, and/or medication information. It is not meant to be comprehensive and should be used as a tool to help the user understand and/or assess potential diagnostic and treatment options. It does NOT include all information about conditions, treatments, medications, side effects, or risks that may apply to a specific patient. It is not intended to be medical advice or a substitute for the medical advice, diagnosis, or treatment of a health care provider based on the health care provider's examination and assessment of a patient's specific and unique circumstances. Patients must speak with a health care provider for complete information about their health, medical questions, and treatment options, including any risks or benefits regarding use of medications. This information does not endorse any treatments or medications as safe, effective, or approved for treating a specific patient. UpToDate, Inc. and its affiliates disclaim any warranty or liability relating to this information or the use thereof.The use of this information is governed by the Terms of Use, available at https://www.wolterskluwer.com/en/know/clinical-effectiveness-terms. 2024© UpToDate, Inc. and its affiliates and/or licensors. All rights reserved.  Copyright   © 2024 UpToDate, Inc. and/or its affiliates. All rights  reserved.

## 2024-07-23 ENCOUNTER — TELEPHONE (OUTPATIENT)
Age: 49
End: 2024-07-23

## 2024-07-23 NOTE — TELEPHONE ENCOUNTER
Writer attempted to contact pt regarding referral to verify services needed and to inform her that there is no opening available at this time and place her in proper wait list.

## 2024-08-13 ENCOUNTER — TELEPHONE (OUTPATIENT)
Age: 49
End: 2024-08-13

## 2024-08-13 ENCOUNTER — PREP FOR PROCEDURE (OUTPATIENT)
Age: 49
End: 2024-08-13

## 2024-08-13 DIAGNOSIS — Z12.11 SCREENING FOR COLON CANCER: Primary | ICD-10-CM

## 2024-08-13 NOTE — TELEPHONE ENCOUNTER
Scheduled date of colonoscopy (as of today): 9/9/24  Physician performing colonoscopy: CHANEL  Location of colonoscopy: [facility]  Bowel prep reviewed with patient: MICHELE/NANCY  Instructions to [facility]     On Diabetic meds     08/13/24  Screened by: Idalia Tenorio MA    Referring Provider     Pre- Screening:     There is no height or weight on file to calculate BMI. 34.74  Has patient been referred for a routine screening Colonoscopy? yes  Is the patient between 45-75 years old? yes      Previous Colonoscopy no   If yes:    Date:     Facility:     Reason:       Does the patient want to see a Gastroenterologist prior to their procedure OR are they having any GI symptoms? no    Has the patient been hospitalized or had abdominal surgery in the past 6 months? no    Does the patient use supplemental oxygen? no    Does the patient take Coumadin, Lovenox, Plavix, Elliquis, Xarelto, or other blood thinning medication? no    Has the patient had a stroke, cardiac event, or stent placed in the past year? no      If patient is between 45yrs - 49yrs, please advise patient that we will have to confirm benefits & coverage with their insurance company for a routine screening colonoscopy.

## 2024-09-03 RX ORDER — LISDEXAMFETAMINE DIMESYLATE 50 MG/1
50 CAPSULE ORAL DAILY
COMMUNITY
Start: 2024-07-09

## 2024-09-09 ENCOUNTER — ANESTHESIA (OUTPATIENT)
Dept: GASTROENTEROLOGY | Facility: HOSPITAL | Age: 49
End: 2024-09-09
Payer: COMMERCIAL

## 2024-09-09 ENCOUNTER — HOSPITAL ENCOUNTER (OUTPATIENT)
Dept: GASTROENTEROLOGY | Facility: HOSPITAL | Age: 49
Setting detail: OUTPATIENT SURGERY
Discharge: HOME | End: 2024-09-09
Attending: INTERNAL MEDICINE
Payer: COMMERCIAL

## 2024-09-09 ENCOUNTER — ANESTHESIA EVENT (OUTPATIENT)
Dept: GASTROENTEROLOGY | Facility: HOSPITAL | Age: 49
End: 2024-09-09
Payer: COMMERCIAL

## 2024-09-09 VITALS
SYSTOLIC BLOOD PRESSURE: 132 MMHG | HEART RATE: 67 BPM | BODY MASS INDEX: 33.87 KG/M2 | HEIGHT: 64 IN | OXYGEN SATURATION: 99 % | TEMPERATURE: 98.6 F | DIASTOLIC BLOOD PRESSURE: 85 MMHG | WEIGHT: 198.41 LBS | RESPIRATION RATE: 20 BRPM

## 2024-09-09 DIAGNOSIS — Z12.11 SCREENING FOR COLON CANCER: ICD-10-CM

## 2024-09-09 PROBLEM — E11.9 DIABETES MELLITUS, TYPE 2 (HCC): Status: ACTIVE | Noted: 2024-09-09

## 2024-09-09 LAB — GLUCOSE SERPL-MCNC: 82 MG/DL (ref 65–140)

## 2024-09-09 PROCEDURE — 82948 REAGENT STRIP/BLOOD GLUCOSE: CPT

## 2024-09-09 PROCEDURE — 88305 TISSUE EXAM BY PATHOLOGIST: CPT | Performed by: PATHOLOGY

## 2024-09-09 PROCEDURE — 45385 COLONOSCOPY W/LESION REMOVAL: CPT | Performed by: INTERNAL MEDICINE

## 2024-09-09 RX ORDER — ONDANSETRON 2 MG/ML
4 INJECTION INTRAMUSCULAR; INTRAVENOUS ONCE AS NEEDED
Status: CANCELLED | OUTPATIENT
Start: 2024-09-09

## 2024-09-09 RX ORDER — SODIUM CHLORIDE, SODIUM LACTATE, POTASSIUM CHLORIDE, CALCIUM CHLORIDE 600; 310; 30; 20 MG/100ML; MG/100ML; MG/100ML; MG/100ML
INJECTION, SOLUTION INTRAVENOUS CONTINUOUS PRN
Status: DISCONTINUED | OUTPATIENT
Start: 2024-09-09 | End: 2024-09-09

## 2024-09-09 RX ORDER — PROPOFOL 10 MG/ML
INJECTION, EMULSION INTRAVENOUS AS NEEDED
Status: DISCONTINUED | OUTPATIENT
Start: 2024-09-09 | End: 2024-09-09

## 2024-09-09 RX ORDER — LIDOCAINE HYDROCHLORIDE 10 MG/ML
INJECTION, SOLUTION EPIDURAL; INFILTRATION; INTRACAUDAL; PERINEURAL AS NEEDED
Status: DISCONTINUED | OUTPATIENT
Start: 2024-09-09 | End: 2024-09-09

## 2024-09-09 RX ADMIN — PROPOFOL 50 MG: 10 INJECTION, EMULSION INTRAVENOUS at 07:27

## 2024-09-09 RX ADMIN — PROPOFOL 50 MG: 10 INJECTION, EMULSION INTRAVENOUS at 07:29

## 2024-09-09 RX ADMIN — PROPOFOL 50 MG: 10 INJECTION, EMULSION INTRAVENOUS at 07:32

## 2024-09-09 RX ADMIN — PROPOFOL 40 MG: 10 INJECTION, EMULSION INTRAVENOUS at 07:34

## 2024-09-09 RX ADMIN — PROPOFOL 30 MG: 10 INJECTION, EMULSION INTRAVENOUS at 07:24

## 2024-09-09 RX ADMIN — PROPOFOL 30 MG: 10 INJECTION, EMULSION INTRAVENOUS at 07:22

## 2024-09-09 RX ADMIN — PROPOFOL 100 MG: 10 INJECTION, EMULSION INTRAVENOUS at 07:20

## 2024-09-09 RX ADMIN — PROPOFOL 50 MG: 10 INJECTION, EMULSION INTRAVENOUS at 07:26

## 2024-09-09 RX ADMIN — PROPOFOL 50 MG: 10 INJECTION, EMULSION INTRAVENOUS at 07:31

## 2024-09-09 RX ADMIN — LIDOCAINE HYDROCHLORIDE 50 MG: 10 INJECTION, SOLUTION EPIDURAL; INFILTRATION; INTRACAUDAL; PERINEURAL at 07:20

## 2024-09-09 RX ADMIN — PROPOFOL 50 MG: 10 INJECTION, EMULSION INTRAVENOUS at 07:28

## 2024-09-09 RX ADMIN — SODIUM CHLORIDE, SODIUM LACTATE, POTASSIUM CHLORIDE, AND CALCIUM CHLORIDE: .6; .31; .03; .02 INJECTION, SOLUTION INTRAVENOUS at 07:16

## 2024-09-09 NOTE — H&P
History and Physical -  [facility]  Paula Rossi 49 y.o. female MRN: [id number]      HPI: Paula Rossi is a 49 y.o. year old female who presents for screening colonoscopy      REVIEW OF SYSTEMS: Per the HPI, and otherwise unremarkable.    Historical Information   Past Medical History:   Diagnosis Date    Anxiety     Arthritis     Claustrophobia     Concussion with no loss of consciousness 06/25/2024    Depression     History of PCOS     History of seizures as a child     fever related    Hypertension     Seasonal allergies     Wears reading eyeglasses      Past Surgical History:   Procedure Laterality Date    CERVICAL CONE BIOPSY      ESSURE TUBAL LIGATION      HYSTERECTOMY  2016    age 41    INCONTINENCE SURGERY      bladder sling    OOPHORECTOMY Bilateral 2016    age 41    PILONIDAL CYST / SINUS EXCISION      [place] ARTHRODESIS MIDTARSOMETATARSAL SINGLE JOINT Left 11/14/2023    Procedure: Lapiplasty style bunionectomy of the left foot w/ plate & screw fixation;  Surgeon: Apollo Antonio DPM;  Location: [facility];  Service: Podiatry    [place] CORRJ HLX VLGS BNCTY SESMDC JOINT ARTHRODESIS Right 05/17/2022    Procedure: 1st metatarsal cuneiform joint fusion-Lapiplasty procedure, modified Knox bunionectomy and second digit arthrodesis at proximal interphalangeal joint with capsular release at 2nd metatarsal phalangeal joint;  Surgeon: Shereen Hope DPM;  Location: [facility];  Service: Podiatry    TONSILLECTOMY      WRIST SURGERY Left      Social History   Social History     Substance and Sexual Activity   Alcohol Use Yes    Alcohol/week: 2.0 standard drinks of alcohol    Types: 2 Glasses of wine per week    Comment: socially. wine. liqour     Social History     Substance and Sexual Activity   Drug Use Never     Social History     Tobacco Use   Smoking Status Former    Current packs/day: 0.00    Average packs/day: 0.5 packs/day for 20.0 years (10.0 ttl pk-yrs)    Types: Cigarettes    Start date:  "5/12/1994    Quit date: 5/12/2014    Years since quitting: 10.3   Smokeless Tobacco Never     Family History   Problem Relation Age of Onset    No Known Problems Mother     No Known Problems Father     No Known Problems Sister     No Known Problems Sister     Breast cancer Maternal Grandmother         50s    Lung cancer Paternal Grandmother     No Known Problems Paternal Grandfather     Colon cancer Brother     Stomach cancer Maternal Aunt     No Known Problems Maternal Aunt     No Known Problems Maternal Aunt     No Known Problems Maternal Aunt     Colon cancer Other     No Known Problems Paternal Aunt     Breast cancer additional onset Neg Hx     BRCA2 Positive Neg Hx     BRCA2 Negative Neg Hx     BRCA1 Positive Neg Hx     BRCA1 Negative Neg Hx     BRCA 1/2 Neg Hx     Endometrial cancer Neg Hx     Ovarian cancer Neg Hx        Meds/Allergies     Not in a hospital admission.    No Known Allergies    Objective     Blood pressure 135/85, pulse 70, temperature 98.2 °F (36.8 °C), temperature source Temporal, resp. rate 17, height 5' 4\" (1.626 m), weight 90 kg (198 lb 6.6 oz), SpO2 97%.      PHYSICAL EXAM    Gen: NAD  CV: RRR  CHEST: Clear  ABD: soft, NT/ND  EXT: no edema      ASSESSMENT/PLAN:  This is a 49 y.o. year old female here for colonoscopy, and she is stable and optimized for her procedure.          "

## 2024-09-09 NOTE — ANESTHESIA PREPROCEDURE EVALUATION
Procedure:  COLONOSCOPY    Relevant Problems   ANESTHESIA (within normal limits)      CARDIO   (+) Essential hypertension      ENDO   (+) Diabetes mellitus, type 2 (HCC)      NEURO/PSYCH   (+) Depressive disorder   (+) ROMULO (generalized anxiety disorder)      PULMONARY (within normal limits)        Physical Exam    Airway    Mallampati score: II  TM Distance: >3 FB  Neck ROM: full     Dental   No notable dental hx     Cardiovascular  Cardiovascular exam normal    Pulmonary  Pulmonary exam normal     Other Findings  post-pubertal.      Anesthesia Plan  ASA Score- 2     Anesthesia Type-     Plan Factors-Exercise tolerance (METS): >4 METS.    Chart reviewed. EKG reviewed.       Patient is not a current smoker.              Induction-     Postoperative Plan-         Informed Consent- Anesthetic plan and risks discussed with patient.  I personally reviewed this patient with the CRNA. Discussed and agreed on the Anesthesia Plan with the CRNA..

## 2024-09-09 NOTE — ANESTHESIA POSTPROCEDURE EVALUATION
Post-Op Assessment Note    CV Status:  Stable  Pain Score: 0    Pain management: adequate       Mental Status:  Alert and awake   Hydration Status:  Euvolemic   PONV Controlled:  Controlled   Airway Patency:  Patent     Post Op Vitals Reviewed: Yes    No anethesia notable event occurred.    Staff: CRNA               /59 (09/09/24 0740)    Temp 98.6 °F (37 °C) (09/09/24 0740)    Pulse 81 (09/09/24 0740)   Resp 15 (09/09/24 0740)    SpO2 94 % (09/09/24 0740)

## 2024-09-09 NOTE — H&P
History and Physical -  [facility]  Paula Rossi 49 y.o. female MRN: [id number]      HPI: Paula Rossi is a 49 y.o. year old female who presents for screening colon      REVIEW OF SYSTEMS: Per the HPI, and otherwise unremarkable.    Historical Information   Past Medical History:   Diagnosis Date    Anxiety     Arthritis     Claustrophobia     Concussion with no loss of consciousness 06/25/2024    Depression     History of PCOS     History of seizures as a child     fever related    Hypertension     Seasonal allergies     Wears reading eyeglasses      Past Surgical History:   Procedure Laterality Date    CERVICAL CONE BIOPSY      ESSURE TUBAL LIGATION      HYSTERECTOMY  2016    age 41    INCONTINENCE SURGERY      bladder sling    OOPHORECTOMY Bilateral 2016    age 41    PILONIDAL CYST / SINUS EXCISION      [place] ARTHRODESIS MIDTARSOMETATARSAL SINGLE JOINT Left 11/14/2023    Procedure: Lapiplasty style bunionectomy of the left foot w/ plate & screw fixation;  Surgeon: Apollo Antonio DPM;  Location: [facility];  Service: Podiatry    [place] CORRJ HLX VLGS BNCTY SESMDC JOINT ARTHRODESIS Right 05/17/2022    Procedure: 1st metatarsal cuneiform joint fusion-Lapiplasty procedure, modified Knox bunionectomy and second digit arthrodesis at proximal interphalangeal joint with capsular release at 2nd metatarsal phalangeal joint;  Surgeon: Shereen Hope DPM;  Location: [facility];  Service: Podiatry    TONSILLECTOMY      WRIST SURGERY Left      Social History   Social History     Substance and Sexual Activity   Alcohol Use Yes    Alcohol/week: 2.0 standard drinks of alcohol    Types: 2 Glasses of wine per week    Comment: socially. wine. liqour     Social History     Substance and Sexual Activity   Drug Use Never     Social History     Tobacco Use   Smoking Status Former    Current packs/day: 0.00    Average packs/day: 0.5 packs/day for 20.0 years (10.0 ttl pk-yrs)    Types: Cigarettes    Start date: 5/12/1994  "   Quit date: 5/12/2014    Years since quitting: 10.3   Smokeless Tobacco Never     Family History   Problem Relation Age of Onset    No Known Problems Mother     No Known Problems Father     No Known Problems Sister     No Known Problems Sister     Breast cancer Maternal Grandmother         50s    Lung cancer Paternal Grandmother     No Known Problems Paternal Grandfather     Colon cancer Brother     Stomach cancer Maternal Aunt     No Known Problems Maternal Aunt     No Known Problems Maternal Aunt     No Known Problems Maternal Aunt     Colon cancer Other     No Known Problems Paternal Aunt     Breast cancer additional onset Neg Hx     BRCA2 Positive Neg Hx     BRCA2 Negative Neg Hx     BRCA1 Positive Neg Hx     BRCA1 Negative Neg Hx     BRCA 1/2 Neg Hx     Endometrial cancer Neg Hx     Ovarian cancer Neg Hx        Meds/Allergies     Not in a hospital admission.    No Known Allergies    Objective     Blood pressure 135/85, pulse 70, temperature 98.2 °F (36.8 °C), temperature source Temporal, resp. rate 17, height 5' 4\" (1.626 m), weight 90 kg (198 lb 6.6 oz), SpO2 97%.      PHYSICAL EXAM    Gen: NAD  CV: RRR  CHEST: Clear  ABD: soft, NT/ND  EXT: no edema      ASSESSMENT/PLAN:  This is a 49 y.o. year old female here for colonoscopy, and she is stable and optimized for her procedure.          "

## 2024-09-12 ENCOUNTER — APPOINTMENT (EMERGENCY)
Dept: RADIOLOGY | Facility: HOSPITAL | Age: 49
End: 2024-09-12
Payer: COMMERCIAL

## 2024-09-12 ENCOUNTER — TELEPHONE (OUTPATIENT)
Age: 49
End: 2024-09-12

## 2024-09-12 ENCOUNTER — HOSPITAL ENCOUNTER (EMERGENCY)
Facility: HOSPITAL | Age: 49
Discharge: HOME | End: 2024-09-12
Attending: EMERGENCY MEDICINE
Payer: COMMERCIAL

## 2024-09-12 VITALS
SYSTOLIC BLOOD PRESSURE: 173 MMHG | RESPIRATION RATE: 18 BRPM | HEART RATE: 99 BPM | DIASTOLIC BLOOD PRESSURE: 96 MMHG | OXYGEN SATURATION: 98 %
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
## 2024-09-12 DIAGNOSIS — S61.259A DOG BITE OF FINGER, INITIAL ENCOUNTER: Primary | ICD-10-CM

## 2024-09-12 DIAGNOSIS — S68.621A PARTIAL TRAUMATIC AMPUTATION OF LEFT INDEX FINGER THROUGH PHALANX, INITIAL ENCOUNTER: ICD-10-CM

## 2024-09-12 DIAGNOSIS — S62.635B OPEN DISPLACED FRACTURE OF DISTAL PHALANX OF LEFT RING FINGER, INITIAL ENCOUNTER: ICD-10-CM

## 2024-09-12 PROCEDURE — 88305 TISSUE EXAM BY PATHOLOGIST: CPT | Performed by: PATHOLOGY

## 2024-09-12 PROCEDURE — 99285 EMERGENCY DEPT VISIT HI MDM: CPT | Performed by: PHYSICIAN ASSISTANT

## 2024-09-12 PROCEDURE — 96367 TX/PROPH/DG ADDL SEQ IV INF: CPT

## 2024-09-12 PROCEDURE — 96375 TX/PRO/DX INJ NEW DRUG ADDON: CPT

## 2024-09-12 PROCEDURE — 96365 THER/PROPH/DIAG IV INF INIT: CPT

## 2024-09-12 PROCEDURE — 73140 X-RAY EXAM OF FINGER(S): CPT

## 2024-09-12 PROCEDURE — 99283 EMERGENCY DEPT VISIT LOW MDM: CPT

## 2024-09-12 PROCEDURE — NC001 PR NO CHARGE: Performed by: ORTHOPAEDIC SURGERY

## 2024-09-12 PROCEDURE — 96376 TX/PRO/DX INJ SAME DRUG ADON: CPT

## 2024-09-12 RX ORDER — IBUPROFEN 600 MG/1
600 TABLET, FILM COATED ORAL EVERY 6 HOURS PRN
Qty: 30 TABLET | Refills: 0 | Status: SHIPPED | OUTPATIENT
Start: 2024-09-12 | End: 2024-09-12

## 2024-09-12 RX ORDER — LIDOCAINE HYDROCHLORIDE 10 MG/ML
INJECTION, SOLUTION EPIDURAL; INFILTRATION; INTRACAUDAL; PERINEURAL
Status: COMPLETED
Start: 2024-09-12 | End: 2024-09-12

## 2024-09-12 RX ORDER — KETOROLAC TROMETHAMINE 30 MG/ML
15 INJECTION, SOLUTION INTRAMUSCULAR; INTRAVENOUS ONCE
Status: COMPLETED | OUTPATIENT
Start: 2024-09-12 | End: 2024-09-12

## 2024-09-12 RX ORDER — CEFAZOLIN SODIUM 2 G/50ML
2000 SOLUTION INTRAVENOUS ONCE
Status: COMPLETED | OUTPATIENT
Start: 2024-09-12 | End: 2024-09-12

## 2024-09-12 RX ORDER — KETOROLAC TROMETHAMINE 30 MG/ML
15 INJECTION, SOLUTION INTRAMUSCULAR; INTRAVENOUS ONCE
Status: DISCONTINUED | OUTPATIENT
Start: 2024-09-12 | End: 2024-09-12

## 2024-09-12 RX ORDER — OXYCODONE HYDROCHLORIDE 5 MG/1
5 TABLET ORAL EVERY 6 HOURS PRN
Qty: 12 TABLET | Refills: 0 | Status: SHIPPED | OUTPATIENT
Start: 2024-09-12 | End: 2024-09-15

## 2024-09-12 RX ORDER — IBUPROFEN 600 MG/1
600 TABLET, FILM COATED ORAL EVERY 6 HOURS PRN
Qty: 30 TABLET | Refills: 0 | Status: SHIPPED | OUTPATIENT
Start: 2024-09-12

## 2024-09-12 RX ORDER — HYDROMORPHONE HCL/PF 1 MG/ML
0.5 SYRINGE (ML) INJECTION ONCE
Status: COMPLETED | OUTPATIENT
Start: 2024-09-12 | End: 2024-09-12

## 2024-09-12 RX ORDER — LIDOCAINE HYDROCHLORIDE 10 MG/ML
30 INJECTION, SOLUTION EPIDURAL; INFILTRATION; INTRACAUDAL; PERINEURAL ONCE
Status: COMPLETED | OUTPATIENT
Start: 2024-09-12 | End: 2024-09-12

## 2024-09-12 RX ADMIN — SODIUM CHLORIDE 3 G: 9 INJECTION, SOLUTION INTRAVENOUS at 14:02

## 2024-09-12 RX ADMIN — HYDROMORPHONE HYDROCHLORIDE 0.5 MG: 1 INJECTION, SOLUTION INTRAMUSCULAR; INTRAVENOUS; SUBCUTANEOUS at 15:26

## 2024-09-12 RX ADMIN — LIDOCAINE HYDROCHLORIDE 30 ML: 10 INJECTION, SOLUTION EPIDURAL; INFILTRATION; INTRACAUDAL; PERINEURAL at 14:01

## 2024-09-12 RX ADMIN — MORPHINE SULFATE 2 MG: 2 INJECTION, SOLUTION INTRAMUSCULAR; INTRAVENOUS at 12:12

## 2024-09-12 RX ADMIN — HYDROMORPHONE HYDROCHLORIDE 0.5 MG: 1 INJECTION, SOLUTION INTRAMUSCULAR; INTRAVENOUS; SUBCUTANEOUS at 14:02

## 2024-09-12 RX ADMIN — CEFAZOLIN SODIUM 2000 MG: 2 SOLUTION INTRAVENOUS at 11:39

## 2024-09-12 RX ADMIN — KETOROLAC TROMETHAMINE 15 MG: 30 INJECTION, SOLUTION INTRAMUSCULAR; INTRAVENOUS at 11:38

## 2024-09-12 NOTE — TELEPHONE ENCOUNTER
Hello,    Please advise if a forced appointment can be accommodated for the patient:    Call back #: [phone]    Insurance: [employer]    Reason for appointment: S68.621A (ICD-10-CM) - Partial traumatic amputation of left index finger through phalanx, initial encounter     Requested doctor and/or location: [facility] either location      Thank you.

## 2024-09-12 NOTE — CONSULTS
Consultation - Orthopedics   Name: Paula Rossi 49 y.o. female I MRN: [id number]  Unit/Bed#: [facility] I Date of Admission: 9/12/2024   Date of Service: 9/12/2024 I Hospital Day: 0   Consult to orthopedics  Consult performed by: Pauly Gallagher MD  Consult ordered by: Bebe Lee PA-C        Physician Requesting Evaluation: Sean Olson MD   Reason for Evaluation / Principal Problem: left index finger partial amputation and middle finger laceration    Assessment & Plan  Partial traumatic transphalangeal amputation of left index finger  49-year-old female right-hand-dominant with a traumatic partial transphalangeal amputation involving the distal phalanx of the left index finger after dog bite injury.  Patient also noted to have a laceration extending into the nailbed of the left middle finger.     Radiology:   I personally reviewed the films.  Hand x rays AP/Lateral oblique views showed comminuted fracture of the distal third of the distal phalanx of index finger    _*_*_*_*_*_*_*_*_*_*_*_*_*_*_*_*_*_*_*_*_*_*_*_*_*_*_*_*_*_*_*_*_*_*_*_*_*_*_*_*_*    Procedure: Nail removal and nail bed repair  After informed consent was obtained a digital block via a volar approach with 1% lidocaine was given to the volar and dorsal aspects of the index and middle fingers.  Once adequate anesthesia was obtained the wound was then copiously irrigated with 3L of NS.  The area was then sterilely prepped and draped. The nail of the index and middle finger were removed with dissecting scissors and irrigated with 1L of NS each. The sterile matrix of the index finger was then repaired with 6-0 chromic gut sutures. The middle finger laceration extended into the paronychium without further extension into the nail sterile matrix. The middle finger laceration was repaired with 4-0 chromic gut. Xerofoam was then reinserted under the eponychium of both nail beds to keep the proximal nail fold open. The fingers were the dressed with  xeroform, berkley wrap, and webril and splinted in a clamshell over the index and middle fingers. Pt tolerated the procedure well and was neurovascularly intact pre and post procedure    Plan:   NWB L hand with index finger in clamshell splint to index and middle finger  Antibiotics 7 days augmentin and pain meds per ED. Tetanus updated and 1 dose unysan administered in ED  Pt instructed to keep splint clean and dry at all times and to return to ED if pain is not controlled with oral pain meds or if there is new numbness in fingers.    There is no height or weight on file to calculate BMI. moderately obese. Recommend behavior modifications, nutrition, and physical activity.  Pt is to f/u with Hand Surgery in 1 week    Pauly Gallagher MD    This consult was completed with the senior resident and attending on call.    Ok for discharge from Orthopedics service perspective.    History of Present Illness   HPI: Paula Rossi is a 49 y.o. year old right-hand-dominant female who presents with dog bite injury to left index finger resulting in a partial amputation of the distal tip of index finger and laceration of the middle finger extending to the nailbed.  The dog's cell phone, domesticated and vaccinated.  She denies any other injuries.  She denies any numbness or tingling.  She has a past surgical history of a thumb CMC arthroplasty with tendon interposition to the left hand multiple years ago at outside hospital.  Past surgical history of type 2 diabetes with A1c 5.5.    Review of Systems  I have reviewed the patient's PMH, PSH, Social History, Family History, Meds, and Allergies    Objective      HR:  [94] 94  Resp:  [20] 20  BP: (169)/(115) 169/115  O2 Device: None (Room air)          I/O       None          Lines/Drains/Airways       Active Status       None                  Physical Exam    Left index finger  Examination of the left index finger shows a near circumferential laceration at the distal tip of the index  "finger starting at the mid volar aspect distal tip and extending radially around the finger into the eponychium.  The nail is displaced with exposed sterile matrix.  Residual tip of finger with less than 2 second capillary refill, unable to assess sensation as patient received nerve block prior to consult    Examination of left middle finger shows an approximately 5 mm superficial laceration of the ulnar aspect of the middle finger with extension to the nailbed, capillary refill less than 2 seconds, sensation intact to 2-point discrimination over the radial and ulnar aspect of the middle finger at 6 mm.      Lab Results: I have reviewed the following results:   No results for input(s): \"WBC\", \"HGB\", \"HCT\", \"PLT\", \"BANDSPCT\", \"BUN\", \"CREATININE\", \"PTT\", \"INR\", \"ESR\", \"CRP\" in the last 72 hours.  Blood Culture: No results found for: \"BLOODCX\"  Wound Culture: No results found for: \"WOUNDCULT\"  "

## 2024-09-12 NOTE — ASSESSMENT & PLAN NOTE
49-year-old female right-hand-dominant with a traumatic partial transphalangeal amputation involving the distal phalanx of the left index finger after dog bite injury.  Patient also noted to have a laceration extending into the nailbed of the left middle finger.     Radiology:   I personally reviewed the films.  Hand x rays AP/Lateral oblique views showed comminuted fracture of the distal third of the distal phalanx of index finger    _*_*_*_*_*_*_*_*_*_*_*_*_*_*_*_*_*_*_*_*_*_*_*_*_*_*_*_*_*_*_*_*_*_*_*_*_*_*_*_*_*    Procedure: Nail removal and nail bed repair  After informed consent was obtained a digital block via a volar approach with 1% lidocaine was given to the volar and dorsal aspects of the index and middle fingers.  Once adequate anesthesia was obtained the wound was then copiously irrigated with 3L of NS.  The area was then sterilely prepped and draped. The nail of the index and middle finger were removed with dissecting scissors and irrigated with 1L of NS each. The sterile matrix of the index finger was then repaired with 6-0 chromic gut sutures. The middle finger laceration extended into the paronychium without further extension into the nail sterile matrix. The middle finger laceration was repaired with 4-0 chromic gut. Xerofoam was then reinserted under the eponychium of both nail beds to keep the proximal nail fold open. The fingers were the dressed with xeroform, berkley wrap, and webril and splinted in a clamshell over the index and middle fingers. Pt tolerated the procedure well and was neurovascularly intact pre and post procedure    Plan:   NWB L hand with index finger in clamshell splint to index and middle finger  Antibiotics 7 days augmentin and pain meds per ED. Tetanus updated and 1 dose unysan administered in ED  Pt instructed to keep splint clean and dry at all times and to return to ED if pain is not controlled with oral pain meds or if there is new numbness in fingers.    There is no  height or weight on file to calculate BMI. moderately obese. Recommend behavior modifications, nutrition, and physical activity.  Pt is to f/u with Hand Surgery in 1 week    Pauly Gallagher MD    This consult was completed with the senior resident and attending on call.

## 2024-09-12 NOTE — ED PROVIDER NOTES
1. Dog bite of finger, initial encounter    2. Open displaced fracture of distal phalanx of left ring finger, initial encounter    3. 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                   Assessment & Plan       Medical Decision Making  Differential diagnosis includes but not limited to: Traumatic amputation of finger, open fracture, dog bite injury    Problems Addressed:  Dog bite of finger, initial encounter: acute illness or injury  Open displaced fracture of distal phalanx of left ring finger, initial encounter: acute illness or injury  Partial traumatic amputation of left index finger through phalanx, initial encounter: acute illness or injury    Amount and/or Complexity of Data Reviewed  Radiology: ordered and independent interpretation performed. Decision-making details documented in ED Course.    Risk  Prescription drug management.                  ED Course as of 09/12/24 1533   Thu Sep 12, 2024   1120 Message sent to hand surgery on call regarding case.   1124 Discussed case with hand surgery.  Will see in consultation.       Medications   ketorolac (TORADOL) injection 15 mg (15 mg Intravenous Given 9/12/24 1138)   ceFAZolin (ANCEF) IVPB (premix in dextrose) 2,000 mg 50 mL (0 mg Intravenous Stopped 9/12/24 1210)   morphine injection 2 mg (2 mg Intravenous Given 9/12/24 1212)   ampicillin-sulbactam (UNASYN) 3 g in sodium chloride 0.9 % 100 mL IVPB (0 g Intravenous Stopped 9/12/24 1432)   HYDROmorphone (DILAUDID) injection 0.5 mg (0.5 mg Intravenous Given 9/12/24 1402)   lidocaine (PF) (XYLOCAINE-MPF) 1 % injection 30 mL (30 mL Infiltration Given by Other 9/12/24 1401)   HYDROmorphone (DILAUDID) injection 0.5 mg (0.5 mg Intravenous Given 9/12/24 1526)       History of Present Illness        49-year-old female presents the emergency department with an injury to the left hand.  States that she was trying to take her dog to the vet after giving her a sedative when she got nervous and bit her while trying to put on her muscle.  Dog is up-to-date on vaccines.  Patient states she is also up-to-date on her tetanus shot.  Most recently within the past year while traveling.  States that the dog bit her second and third digits on the left hand.  States that the 1 finger seems to be partially amputated.  Bleeding has been controlled.  Patient is right-handed.      History provided by:  Patient   used: No    Finger Laceration  Location:  Finger  Finger laceration location:  L index finger and L middle finger  Quality: jagged    Injury mechanism: animal bite.  Pain details:     Quality:  Aching and throbbing    Severity:  Moderate    Timing:  Constant    Progression:  Waxing and waning  Relieved by:  Nothing  Tetanus status:  Up to date  Associated symptoms: no fever and no rash            Review of Systems   Constitutional:  Negative for chills and fever.   HENT:  Negative for congestion, dental problem and sore throat.    Respiratory:  Negative for cough.    Cardiovascular:  Negative for chest pain.   Gastrointestinal:  Negative for abdominal pain.   Musculoskeletal:  Positive for arthralgias (finger pain). Negative for back pain and neck pain.   Skin:  Positive for wound. Negative for rash.   All other systems reviewed and are negative.          Objective     ED Triage Vitals [09/12/24 1034]   Temp Pulse Blood Pressure Respirations SpO2 Patient Position - Orthostatic VS   -- 94 (!) 169/115 20 99 % Lying      Temp src Heart Rate Source BP Location FiO2 (%) Pain Score    -- Monitor Right arm -- 7        Physical Exam  Vitals and nursing note reviewed.   Constitutional:       Appearance: She is well-developed.   HENT:      Head: Normocephalic and atraumatic.   Cardiovascular:      Rate and  Rhythm: Normal rate and regular rhythm.   Pulmonary:      Effort: Pulmonary effort is normal. No respiratory distress.      Breath sounds: No wheezing, rhonchi or rales.   Musculoskeletal:      Left hand: Tenderness and bony tenderness present. No swelling, deformity or lacerations. Normal range of motion.      Comments: Wounds to the left distal second and third digits.  Third digit with small puncture wound to the palmar aspect of the distal phalanx.  Left second digit with partial amputation.   Skin:     General: Skin is warm and dry.   Neurological:      General: No focal deficit present.      Mental Status: She is alert and oriented to person, place, and time.   Psychiatric:         Mood and Affect: Mood normal.         Behavior: Behavior normal.               Labs Reviewed - No data to display  XR finger second digit-index LEFT   ED Interpretation by Bebe Lee PA-C (09/12 1256)   Comminuted distal tuft fracture.          Procedures       Bebe Lee PA-C  09/12/24 1516       Bebe Lee PA-C  09/12/24 1538

## 2024-09-16 ENCOUNTER — OFFICE VISIT (OUTPATIENT)
Dept: OBGYN CLINIC | Facility: CLINIC | Age: 49
End: 2024-09-16
Payer: COMMERCIAL

## 2024-09-16 ENCOUNTER — OFFICE VISIT (OUTPATIENT)
Dept: OCCUPATIONAL THERAPY | Facility: CLINIC | Age: 49
End: 2024-09-16
Payer: COMMERCIAL

## 2024-09-16 VITALS — HEIGHT: 64 IN | WEIGHT: 198 LBS | BODY MASS INDEX: 33.8 KG/M2

## 2024-09-16 DIAGNOSIS — S68.621A PARTIAL TRAUMATIC AMPUTATION OF LEFT INDEX FINGER THROUGH PHALANX, INITIAL ENCOUNTER: ICD-10-CM

## 2024-09-16 DIAGNOSIS — S62.631B DISPLACED FRACTURE OF DISTAL PHALANX OF LEFT INDEX FINGER, INITIAL ENCOUNTER FOR OPEN FRACTURE: Primary | ICD-10-CM

## 2024-09-16 PROCEDURE — 26750 TREAT FINGER FRACTURE EACH: CPT | Performed by: ORTHOPAEDIC SURGERY

## 2024-09-16 PROCEDURE — 99204 OFFICE O/P NEW MOD 45 MIN: CPT | Performed by: ORTHOPAEDIC SURGERY

## 2024-09-16 PROCEDURE — 97760 ORTHOTIC MGMT&TRAING 1ST ENC: CPT | Performed by: OCCUPATIONAL THERAPIST

## 2024-09-16 NOTE — PROGRESS NOTES
Orthosis    Diagnosis:   1. 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 Yes  Patient or Caregiver able to don/doff orthotic independently?Yes    Written orders provided to patient? Yes  Orders Obtained: Written  Orders Obtained from: Dr Huffman    Return for evaluation and treatment No; td protocol for dressing

## 2024-09-16 NOTE — PROGRESS NOTES
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 Radiographic findings demonstrate tuft fracture of left index finger.  Explained to the patient that the nail of her left index and long finger should return however is may take approximately 6-9 months for nail re-growth  Encouraged the use of pre-ina vitamins to increase nail growth  Recommended patient complete warm soapy soaks with unscented soap with finger movement for 10 minutes, 1-2x/day the next 1-2 weeks  Informed her that the yellow Xeroform will start to peel up, can be trimmed with manicure scissors until it is able to be pulled away after soaking.  Referral placed to hand therapy for application of cap splints to left index and long fingers to be applied after wrapping fingers for protection  Explained that it will take 4-6 weeks for tuft fracture to heal  Recommended use of solution mix of 50% water, 50% peroxide on a wash cloth to remove blood around the fingers  Patient will follow-up in 2 weeks for re-evaluation of wounds  The patient expresses understanding and is in agreement with today's treatment plan.       Follow Up:  2  week(s)    To Do Next Visit:  Re-evaluation of left index and long finger lacerations    General Discussions:  Fracture - Nonoperative Care: The physiology of a fractured bone was discussed with the patient today.  With nondisplaced or minimally displaced fractures, conservative treatment often results in a functional recovery.  Typically, these fractures are immobilized in either a cast or splint depending on the pattern.  Radiographs are typically taken at intervals throughout the fracture healing to ensure that muscular forces do not cause loss of reduction or alignment.  If the fracture loses its alignment, surgical intervention may be  required to stabilize it.  Medical conditions such as diabetes, osteoporosis, vitamin D deficiency, and a history of or exposure to smoking may delay or prevent fracture healing.    Operative Discussions:      _____________________________________________________  CHIEF COMPLAINT:  Chief Complaint   Patient presents with    Left Hand - New Patient Visit     ED - 9/12/24  Index + Long finger dog bite         SUBJECTIVE:  Paula Rossi is a 49 y.o. female who presents with traumatic partial amputation of left index finger and lacerations to left index and long fingers following evaluation from ED. Patient states that she was trying to place a muzzle onto her dog to go to the vet when the dog became nervous and bit her fingers. She was seen in the ED on 9/12/24 where she had the nail of the left index and long fingers removed. She states that she has pain when he bumps her fingers however resting her pain is minimal.  Radiation: None  Previous Treatments: Nail removal, wound care  Associated symptoms: pain, swelling  Handedness: right  Work status: unknown    PAST MEDICAL HISTORY:  Past Medical History:   Diagnosis Date    Anxiety     Arthritis     Claustrophobia     Concussion with no loss of consciousness 06/25/2024    Depression     History of PCOS     History of seizures as a child     fever related    Hypertension     Seasonal allergies     Wears reading eyeglasses        PAST SURGICAL HISTORY:  Past Surgical History:   Procedure Laterality Date    CERVICAL CONE BIOPSY      ESSURE TUBAL LIGATION      HYSTERECTOMY  2016    age 41    INCONTINENCE SURGERY      bladder sling    OOPHORECTOMY Bilateral 2016    age 41    PILONIDAL CYST / SINUS EXCISION      PA ARTHRODESIS MIDTARSOMETATARSAL SINGLE JOINT Left 11/14/2023    Procedure: Lapiplasty style bunionectomy of the left foot w/ plate & screw fixation;  Surgeon: Apollo Antonio DPM;  Location: [facility];  Service: Podiatry    PA CORRMARGARITO HERRINGX VLGS BNCTY [facility] JOINT  ARTHRODESIS Right 05/17/2022    Procedure: 1st metatarsal cuneiform joint fusion-Lapiplasty procedure, modified Knox bunionectomy and second digit arthrodesis at proximal interphalangeal joint with capsular release at 2nd metatarsal phalangeal joint;  Surgeon: Shereen Hope DPM;  Location: [facility] OR;  Service: Podiatry    TONSILLECTOMY      WRIST SURGERY Left        FAMILY HISTORY:  Family History   Problem Relation Age of Onset    No Known Problems Mother     No Known Problems Father     No Known Problems Sister     No Known Problems Sister     Breast cancer Maternal Grandmother         50s    Lung cancer Paternal Grandmother     No Known Problems Paternal Grandfather     Colon cancer Brother     Stomach cancer Maternal Aunt     No Known Problems Maternal Aunt     No Known Problems Maternal Aunt     No Known Problems Maternal Aunt     Colon cancer Other     No Known Problems Paternal Aunt     Breast cancer additional onset Neg Hx     BRCA2 Positive Neg Hx     BRCA2 Negative Neg Hx     BRCA1 Positive Neg Hx     BRCA1 Negative Neg Hx     BRCA 1/2 Neg Hx     Endometrial cancer Neg Hx     Ovarian cancer Neg Hx        SOCIAL HISTORY:  Social History     Tobacco Use    Smoking status: Former     Current packs/day: 0.00     Average packs/day: 0.5 packs/day for 20.0 years (10.0 ttl pk-yrs)     Types: Cigarettes     Start date: 5/12/1994     Quit date: 5/12/2014     Years since quitting: 10.3    Smokeless tobacco: Never   Vaping Use    Vaping status: Never Used   Substance Use Topics    Alcohol use: Yes     Alcohol/week: 2.0 standard drinks of alcohol     Types: 2 Glasses of wine per week     Comment: socially. wine. liqour    Drug use: Never       MEDICATIONS:    Current Outpatient Medications:     acetaminophen (TYLENOL) 325 mg tablet, Take 650 mg by mouth every 6 (six) hours as needed for mild pain, Disp: , Rfl:     amLODIPine (NORVASC) 5 mg tablet, Take 1 tablet (5 mg total) by mouth daily, Disp: 90 tablet, Rfl:  3    Ascorbic Acid (VITAMIN C PO), Take 1 tablet by mouth daily, Disp: , Rfl:     buPROPion (WELLBUTRIN SR) 200 MG 12 hr tablet, Take 200 mg by mouth 2 (two) times a day, Disp: , Rfl:     COLLAGEN PO, Take by mouth in the morning, Disp: , Rfl:     conjugated estrogens (PREMARIN) 0.625 mg tablet, Take 0.625 mg by mouth in the morning. Take daily for 21 days then do not take for 7 days.., Disp: , Rfl:     DULoxetine (CYMBALTA) 20 mg capsule, Take 1 capsule (20 mg total) by mouth daily, Disp: 90 capsule, Rfl: 1    fluticasone (FLONASE) 50 mcg/act nasal spray, 2 sprays into each nostril if needed, Disp: , Rfl:     Gluc-Chonn-MSM-Boswellia-Vit D (GLUCOSAMINE CHONDROITIN + D3 PO), Take 2 tablets by mouth in the morning, Disp: , Rfl:     glucose 4 g chewable tablet, Chew 16 g as needed for low blood sugar, Disp: , Rfl:     ibuprofen (MOTRIN) 600 mg tablet, Take 1 tablet (600 mg total) by mouth every 6 (six) hours as needed for mild pain, Disp: 30 tablet, Rfl: 0    metFORMIN (GLUCOPHAGE-XR) 750 mg 24 hr tablet, Take 1 tablet (750 mg total) by mouth daily with breakfast, Disp: 90 tablet, Rfl: 1    methylphenidate (CONCERTA) 54 MG ER tablet, 54 mg 2 (two) times a day, Disp: , Rfl:     mometasone (ELOCON) 0.1 % ointment, Apply topically daily (Patient taking differently: Apply topically if needed), Disp: 45 g, Rfl: 0    multivitamin (THERAGRAN) TABS, Take 1 tablet by mouth daily, Disp: , Rfl:     Tretinoin 0.05 % LOTN, Apply topically if needed, Disp: , Rfl:     VITAMIN D PO, Take 1 capsule by mouth daily, Disp: , Rfl:     lisdexamfetamine (VYVANSE) 50 MG capsule, Take 50 mg by mouth daily, Disp: , Rfl:     ALLERGIES:  No Known Allergies    REVIEW OF SYSTEMS:  Pertinent items are noted in HPI.  A comprehensive review of systems was negative.    LABS:  HgA1c:   Lab Results   Component Value Date    HGBA1C 5.5 12/16/2023     BMP:   Lab Results   Component Value Date    CALCIUM 9.8 12/16/2023    K 4.4 12/16/2023    CO2 31  "12/16/2023     12/16/2023    BUN 17 12/16/2023    CREATININE 0.74 12/16/2023         _____________________________________________________  PHYSICAL EXAMINATION:  Vital signs: Ht 5' 4\" (1.626 m)   Wt 89.8 kg (198 lb)   BMI 33.99 kg/m²   General: well developed and well nourished, alert, oriented times 3, and appears comfortable  Psychiatric: Normal  HEENT: Trachea Midline, No torticollis  Cardiovascular: No discernable arrhythmia  Pulmonary: No wheezing or stridor  Abdomen: No rebound or guarding  Extremities: No peripheral edema  Skin: No masses, erythema, lacerations, fluctation, ulcerations, Laceration of left index and long finger  Neurovascular: Sensation Intact to the Median, Ulnar, Radial Nerve, Motor Intact to the Median, Ulnar, Radial Nerve, and Pulses Intact    MUSCULOSKELETAL EXAMINATION:  Left hand index and long finger with lacerations localized across the finger with nailbed repairs.  Flexor and extensor tendons are intact.  No signs of infection, negative fluctuance, no collection.  No erythema, decreased range of motion secondary to pain.  No discharge in the office.  No instability noted.  FDP and FPL intact      _____________________________________________________  STUDIES REVIEWED:  AP, lateral, and oblique x-rays of the fingers were reviewed by myself today and demonstrates an index finger distal phalanx fracture.      PROCEDURES PERFORMED:  Fracture / Dislocation Treatment    Date/Time: 9/16/2024 2:45 PM    Performed by: Preston Huffman MD  Authorized by: Preston Huffman MD    Patient Location:  Clinic  [place] Protocol:  Consent: Verbal consent obtained.  Risks and benefits: risks, benefits and alternatives were discussed  Consent given by: patient  Radiology Images displayed and confirmed. If images not available, report reviewed: imaging studies available  Patient identity confirmed: verbally with patient    Injury location:  Finger  Location details:  Left index " finger  Injury type:  Fracture  Fracture type: distal phalanx    MCP joint involved?: No    Any IP joint involved?: No    Neurovascular status: Neurovascularly intact    Manipulation performed?: No    Immobilization:  Splint  Neurovascular status: Neurovascularly intact    Distal perfusion: normal    Neurological function: normal    Range of motion: normal    Patient tolerance:  Patient tolerated the procedure well with no immediate complications    Scribe Attestation      I,:  Constanza Vasquez am acting as a scribe while in the presence of the attending physician.:       I,:  Preston Huffman MD personally performed the services described in this documentation    as scribed in my presence.:

## 2024-09-26 ENCOUNTER — TELEPHONE (OUTPATIENT)
Age: 49
End: 2024-09-26

## 2024-09-26 NOTE — TELEPHONE ENCOUNTER
Caller: Paula    Doctor: arline    Reason for call: Patient has been experiencing numbness in the forearm. IS this something to be concerned. She states it is moving up into her neck. Messaged Dr Huffman as he is on call  Please advise  Thank you    Call back#: [phone]

## 2024-10-02 ENCOUNTER — OFFICE VISIT (OUTPATIENT)
Dept: OBGYN CLINIC | Facility: HOSPITAL | Age: 49
End: 2024-10-02

## 2024-10-02 ENCOUNTER — TELEPHONE (OUTPATIENT)
Dept: OBGYN CLINIC | Facility: HOSPITAL | Age: 49
End: 2024-10-02

## 2024-10-02 VITALS — BODY MASS INDEX: 33.8 KG/M2 | HEIGHT: 64 IN | WEIGHT: 198 LBS

## 2024-10-02 DIAGNOSIS — S68.621A PARTIAL TRAUMATIC AMPUTATION OF LEFT INDEX FINGER THROUGH PHALANX, INITIAL ENCOUNTER: Primary | ICD-10-CM

## 2024-10-02 DIAGNOSIS — S62.631B DISPLACED FRACTURE OF DISTAL PHALANX OF LEFT INDEX FINGER, INITIAL ENCOUNTER FOR OPEN FRACTURE: ICD-10-CM

## 2024-10-02 PROCEDURE — 99024 POSTOP FOLLOW-UP VISIT: CPT | Performed by: ORTHOPAEDIC SURGERY

## 2024-10-02 NOTE — TELEPHONE ENCOUNTER
Patients needs a 3 week follow-up with Dr. Huffman. I offered [place] but patient kindly declined. Can you please help with an appointment.    Thank you

## 2024-10-02 NOTE — PROGRESS NOTES
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  Instructed after a shower use a tweezers and gently wiggle to help loosen the sutures and xeroform.    Patient may trim loose edges of the xeroform.  May do half and half peroxide and water soaks to dissolve the scabs  Patient may leave open to air at this time or use a bandaid if more comfortable.     Follow Up:  3  week(s)    To Do Next Visit:  X-rays of the  left  index and long    General Discussions:  Fracture - Nonoperative Care: The physiology of a fractured bone was discussed with the patient today.  With nondisplaced or minimally displaced fractures, conservative treatment often results in a functional recovery.  Typically, these fractures are immobilized in either a cast or splint depending on the pattern.  Radiographs are typically taken at intervals throughout the fracture healing to ensure that muscular forces do not cause loss of reduction or alignment.  If the fracture loses its alignment, surgical intervention may be required to stabilize it.  Medical conditions such as diabetes, osteoporosis, vitamin D deficiency, and a history of or exposure to smoking may delay or prevent fracture healing.      _____________________________________________________  CHIEF COMPLAINT:  Chief Complaint   Patient presents with    Left Hand - New Patient Visit     Index + Long finger dog bite         SUBJECTIVE:  Paula Rossi is a 49 y.o. female who presents for follow up s/p traumatic partial tip amputation of the index and long fingers and left index tuft fracture.  Patient reports she has not worn the cap splints at they were putting more pressure on the nail causing more pain.     Patient also reports left forearm pain    PAST MEDICAL HISTORY:  Past Medical History:   Diagnosis Date    Anxiety      LVM to obtain details for disab form. Arthritis     Claustrophobia     Concussion with no loss of consciousness 06/25/2024    Depression     History of PCOS     History of seizures as a child     fever related    Hypertension     Seasonal allergies     Wears reading eyeglasses        PAST SURGICAL HISTORY:  Past Surgical History:   Procedure Laterality Date    CERVICAL CONE BIOPSY      ESSURE TUBAL LIGATION      HYSTERECTOMY  2016    age 41    INCONTINENCE SURGERY      bladder sling    OOPHORECTOMY Bilateral 2016    age 41    PILONIDAL CYST / SINUS EXCISION      UT ARTHRODESIS MIDTARSOMETATARSAL SINGLE JOINT Left 11/14/2023    Procedure: Lapiplasty style bunionectomy of the left foot w/ plate & screw fixation;  Surgeon: Apollo Antonio DPM;  Location: [facility];  Service: Podiatry    UT CORRJ HLX VLGS BNCTY [facility] JOINT ARTHRODESIS Right 05/17/2022    Procedure: 1st metatarsal cuneiform joint fusion-Lapiplasty procedure, modified Knox bunionectomy and second digit arthrodesis at proximal interphalangeal joint with capsular release at 2nd metatarsal phalangeal joint;  Surgeon: Shereen Hope DPM;  Location: [facility];  Service: Podiatry    TONSILLECTOMY      WRIST SURGERY Left        FAMILY HISTORY:  Family History   Problem Relation Age of Onset    No Known Problems Mother     No Known Problems Father     No Known Problems Sister     No Known Problems Sister     Breast cancer Maternal Grandmother         50s    Lung cancer Paternal Grandmother     No Known Problems Paternal Grandfather     Colon cancer Brother     Stomach cancer Maternal Aunt     No Known Problems Maternal Aunt     No Known Problems Maternal Aunt     No Known Problems Maternal Aunt     Colon cancer Other     No Known Problems Paternal Aunt     Breast cancer additional onset Neg Hx     BRCA2 Positive Neg Hx     BRCA2 Negative Neg Hx     BRCA1 Positive Neg Hx     BRCA1 Negative Neg Hx     BRCA 1/2 Neg Hx     Endometrial cancer Neg Hx     Ovarian cancer Neg Hx        SOCIAL  HISTORY:  Social History     Tobacco Use    Smoking status: Former     Current packs/day: 0.00     Average packs/day: 0.5 packs/day for 20.0 years (10.0 ttl pk-yrs)     Types: Cigarettes     Start date: 5/12/1994     Quit date: 5/12/2014     Years since quitting: 10.4    Smokeless tobacco: Never   Vaping Use    Vaping status: Never Used   Substance Use Topics    Alcohol use: Yes     Alcohol/week: 2.0 standard drinks of alcohol     Types: 2 Glasses of wine per week     Comment: socially. wine. liqour    Drug use: Never       MEDICATIONS:    Current Outpatient Medications:     acetaminophen (TYLENOL) 325 mg tablet, Take 650 mg by mouth every 6 (six) hours as needed for mild pain, Disp: , Rfl:     amLODIPine (NORVASC) 5 mg tablet, Take 1 tablet (5 mg total) by mouth daily, Disp: 90 tablet, Rfl: 3    Ascorbic Acid (VITAMIN C PO), Take 1 tablet by mouth daily, Disp: , Rfl:     buPROPion (WELLBUTRIN SR) 200 MG 12 hr tablet, Take 200 mg by mouth 2 (two) times a day, Disp: , Rfl:     COLLAGEN PO, Take by mouth in the morning, Disp: , Rfl:     conjugated estrogens (PREMARIN) 0.625 mg tablet, Take 0.625 mg by mouth in the morning. Take daily for 21 days then do not take for 7 days.., Disp: , Rfl:     DULoxetine (CYMBALTA) 20 mg capsule, Take 1 capsule (20 mg total) by mouth daily, Disp: 90 capsule, Rfl: 1    fluticasone (FLONASE) 50 mcg/act nasal spray, 2 sprays into each nostril if needed, Disp: , Rfl:     Gluc-Chonn-MSM-Boswellia-Vit D (GLUCOSAMINE CHONDROITIN + D3 PO), Take 2 tablets by mouth in the morning, Disp: , Rfl:     glucose 4 g chewable tablet, Chew 16 g as needed for low blood sugar, Disp: , Rfl:     ibuprofen (MOTRIN) 600 mg tablet, Take 1 tablet (600 mg total) by mouth every 6 (six) hours as needed for mild pain, Disp: 30 tablet, Rfl: 0    metFORMIN (GLUCOPHAGE-XR) 750 mg 24 hr tablet, Take 1 tablet (750 mg total) by mouth daily with breakfast, Disp: 90 tablet, Rfl: 1    methylphenidate (CONCERTA) 54 MG ER  "tablet, 54 mg 2 (two) times a day, Disp: , Rfl:     mometasone (ELOCON) 0.1 % ointment, Apply topically daily (Patient taking differently: Apply topically if needed), Disp: 45 g, Rfl: 0    multivitamin (THERAGRAN) TABS, Take 1 tablet by mouth daily, Disp: , Rfl:     Tretinoin 0.05 % LOTN, Apply topically if needed, Disp: , Rfl:     VITAMIN D PO, Take 1 capsule by mouth daily, Disp: , Rfl:     lisdexamfetamine (VYVANSE) 50 MG capsule, Take 50 mg by mouth daily, Disp: , Rfl:     ALLERGIES:  No Known Allergies    REVIEW OF SYSTEMS:  Pertinent items are noted in HPI.  A comprehensive review of systems was negative.    LABS:  HgA1c:   Lab Results   Component Value Date    HGBA1C 5.5 12/16/2023     BMP:   Lab Results   Component Value Date    CALCIUM 9.8 12/16/2023    K 4.4 12/16/2023    CO2 31 12/16/2023     12/16/2023    BUN 17 12/16/2023    CREATININE 0.74 12/16/2023           _____________________________________________________  PHYSICAL EXAMINATION:  Vital signs: Ht 5' 4\" (1.626 m)   Wt 89.8 kg (198 lb)   BMI 33.99 kg/m²   General: well developed and well nourished, alert, oriented times 3, and appears comfortable  Psychiatric: Normal  HEENT: Trachea Midline, No torticollis  Cardiovascular: No discernable arrhythmia  Pulmonary: No wheezing or stridor  Abdomen: No rebound or guarding  Extremities: No peripheral edema  Skin: No masses, erythema, lacerations, fluctation, ulcerations  Neurovascular: Sensation Intact to the Median, Ulnar, Radial Nerve, Motor Intact to the Median, Ulnar, Radial Nerve, and Pulses Intact    MUSCULOSKELETAL EXAMINATION:  Left hand index and long finger with healing lacerations localized across the finger with nailbed repairs. Chromic sutures intact. Flexor and extensor tendons are intact.  No signs of infection, negative fluctuance, no collection.  No erythema, decreased range of motion secondary to pain.  No discharge in the office.  No instability noted.  FDP and FPL " intact  Positive TTP CMC and 1st dorsal compartment, positive finkl[place]in.   _____________________________________________________  STUDIES REVIEWED:  No Studies to review      PROCEDURES PERFORMED:  Procedures  No Procedures performed today

## 2024-10-23 ENCOUNTER — TELEPHONE (OUTPATIENT)
Dept: OBGYN CLINIC | Facility: HOSPITAL | Age: 49
End: 2024-10-23

## 2024-10-23 ENCOUNTER — OFFICE VISIT (OUTPATIENT)
Dept: OBGYN CLINIC | Facility: HOSPITAL | Age: 49
End: 2024-10-23

## 2024-10-23 ENCOUNTER — HOSPITAL ENCOUNTER (OUTPATIENT)
Dept: RADIOLOGY | Facility: HOSPITAL | Age: 49
Discharge: HOME | End: 2024-10-23
Attending: ORTHOPAEDIC SURGERY
Payer: COMMERCIAL

## 2024-10-23 VITALS — BODY MASS INDEX: 33.8 KG/M2 | HEIGHT: 64 IN | WEIGHT: 198 LBS

## 2024-10-23 DIAGNOSIS — S68.621A PARTIAL TRAUMATIC AMPUTATION OF LEFT INDEX FINGER THROUGH PHALANX, INITIAL ENCOUNTER: ICD-10-CM

## 2024-10-23 DIAGNOSIS — S68.621A PARTIAL TRAUMATIC AMPUTATION OF LEFT INDEX FINGER THROUGH PHALANX, INITIAL ENCOUNTER: Primary | ICD-10-CM

## 2024-10-23 PROCEDURE — 73130 X-RAY EXAM OF HAND: CPT

## 2024-10-23 PROCEDURE — 99024 POSTOP FOLLOW-UP VISIT: CPT | Performed by: ORTHOPAEDIC SURGERY

## 2024-10-23 NOTE — PROGRESS NOTES
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  Patient instructed to use lotion to keep the skin moisturized avoid Vaseline based lotions.    Follow Up:  3  month(s)    To Do Next Visit:  Nail check     _____________________________________________________  CHIEF COMPLAINT:  Chief Complaint   Patient presents with    Left Hand - Fracture     Index + Long finger dog bite         SUBJECTIVE:  Paula Rossi is a 49 y.o. female who presents for follow up regarding traumatic partial tip amputation of the index and long fingers and left index tuft fracture DOI 9/12/24  Since last visit, Paula Rossi has tried loosening the dissolving sutures however she stopped when it caused pain.  Patient reports she just started typing without pain.     PAST MEDICAL HISTORY:  Past Medical History:   Diagnosis Date    Anxiety     Arthritis     Claustrophobia     Concussion with no loss of consciousness 06/25/2024    Depression     History of PCOS     History of seizures as a child     fever related    Hypertension     Seasonal allergies     Wears reading eyeglasses        PAST SURGICAL HISTORY:  Past Surgical History:   Procedure Laterality Date    CERVICAL CONE BIOPSY      ESSURE TUBAL LIGATION      HYSTERECTOMY  2016    age 41    INCONTINENCE SURGERY      bladder sling    OOPHORECTOMY Bilateral 2016    age 41    PILONIDAL CYST / SINUS EXCISION      ID ARTHRODESIS MIDTARSOMETATARSAL SINGLE JOINT Left 11/14/2023    Procedure: Lapiplasty style bunionectomy of the left foot w/ plate & screw fixation;  Surgeon: Apollo Antonio DPM;  Location: [facility] OR;  Service: Podiatry    [facility] [place] [facility] Right 05/17/2022    Procedure: 1st metatarsal cuneiform  joint fusion-Lapiplasty procedure, modified Knox bunionectomy and second digit arthrodesis at proximal interphalangeal joint with capsular release at 2nd metatarsal phalangeal joint;  Surgeon: Shereen Hope DPM;  Location: [facility] OR;  Service: Podiatry    TONSILLECTOMY      WRIST SURGERY Left        FAMILY HISTORY:  Family History   Problem Relation Age of Onset    No Known Problems Mother     No Known Problems Father     No Known Problems Sister     No Known Problems Sister     Breast cancer Maternal Grandmother         50s    Lung cancer Paternal Grandmother     No Known Problems Paternal Grandfather     Colon cancer Brother     Stomach cancer Maternal Aunt     No Known Problems Maternal Aunt     No Known Problems Maternal Aunt     No Known Problems Maternal Aunt     Colon cancer Other     No Known Problems Paternal Aunt     Breast cancer additional onset Neg Hx     BRCA2 Positive Neg Hx     BRCA2 Negative Neg Hx     BRCA1 Positive Neg Hx     BRCA1 Negative Neg Hx     BRCA 1/2 Neg Hx     Endometrial cancer Neg Hx     Ovarian cancer Neg Hx        SOCIAL HISTORY:  Social History     Tobacco Use    Smoking status: Former     Current packs/day: 0.00     Average packs/day: 0.5 packs/day for 20.0 years (10.0 ttl pk-yrs)     Types: Cigarettes     Start date: 5/12/1994     Quit date: 5/12/2014     Years since quitting: 10.4    Smokeless tobacco: Never   Vaping Use    Vaping status: Never Used   Substance Use Topics    Alcohol use: Yes     Alcohol/week: 2.0 standard drinks of alcohol     Types: 2 Glasses of wine per week     Comment: socially. wine. liqour    Drug use: Never       MEDICATIONS:    Current Outpatient Medications:     acetaminophen (TYLENOL) 325 mg tablet, Take 650 mg by mouth every 6 (six) hours as needed for mild pain, Disp: , Rfl:     amLODIPine (NORVASC) 5 mg tablet, Take 1 tablet (5 mg total) by mouth daily, Disp: 90 tablet, Rfl: 3    Ascorbic Acid (VITAMIN C PO), Take 1 tablet by mouth daily,  Disp: , Rfl:     buPROPion (WELLBUTRIN SR) 200 MG 12 hr tablet, Take 200 mg by mouth 2 (two) times a day, Disp: , Rfl:     COLLAGEN PO, Take by mouth in the morning, Disp: , Rfl:     conjugated estrogens (PREMARIN) 0.625 mg tablet, Take 0.625 mg by mouth in the morning. Take daily for 21 days then do not take for 7 days.., Disp: , Rfl:     DULoxetine (CYMBALTA) 20 mg capsule, Take 1 capsule (20 mg total) by mouth daily, Disp: 90 capsule, Rfl: 1    fluticasone (FLONASE) 50 mcg/act nasal spray, 2 sprays into each nostril if needed, Disp: , Rfl:     Gluc-Chonn-MSM-Boswellia-Vit D (GLUCOSAMINE CHONDROITIN + D3 PO), Take 2 tablets by mouth in the morning, Disp: , Rfl:     glucose 4 g chewable tablet, Chew 16 g as needed for low blood sugar, Disp: , Rfl:     ibuprofen (MOTRIN) 600 mg tablet, Take 1 tablet (600 mg total) by mouth every 6 (six) hours as needed for mild pain, Disp: 30 tablet, Rfl: 0    metFORMIN (GLUCOPHAGE-XR) 750 mg 24 hr tablet, Take 1 tablet (750 mg total) by mouth daily with breakfast, Disp: 90 tablet, Rfl: 1    methylphenidate (CONCERTA) 54 MG ER tablet, 54 mg 2 (two) times a day, Disp: , Rfl:     mometasone (ELOCON) 0.1 % ointment, Apply topically daily (Patient taking differently: Apply topically if needed), Disp: 45 g, Rfl: 0    multivitamin (THERAGRAN) TABS, Take 1 tablet by mouth daily, Disp: , Rfl:     Tretinoin 0.05 % LOTN, Apply topically if needed, Disp: , Rfl:     VITAMIN D PO, Take 1 capsule by mouth daily, Disp: , Rfl:     lisdexamfetamine (VYVANSE) 50 MG capsule, Take 50 mg by mouth daily, Disp: , Rfl:     ALLERGIES:  No Known Allergies    REVIEW OF SYSTEMS:  Pertinent items are noted in HPI.  A comprehensive review of systems was negative.    LABS:  HgA1c:   Lab Results   Component Value Date    HGBA1C 5.5 12/16/2023     BMP:   Lab Results   Component Value Date    CALCIUM 9.8 12/16/2023    K 4.4 12/16/2023    CO2 31 12/16/2023     12/16/2023    BUN 17 12/16/2023    CREATININE 0.74  "12/16/2023           _____________________________________________________  PHYSICAL EXAMINATION:  Vital signs: Ht 5' 4\" (1.626 m)   Wt 89.8 kg (198 lb)   BMI 33.99 kg/m²   General: well developed and well nourished, alert, oriented times 3, and appears comfortable  Psychiatric: Normal  HEENT: Trachea Midline, No torticollis  Cardiovascular: No discernable arrhythmia  Pulmonary: No wheezing or stridor  Abdomen: No rebound or guarding  Extremities: No peripheral edema  Skin: No masses, erythema, lacerations, fluctation, ulcerations  Neurovascular: Sensation Intact to the Median, Ulnar, Radial Nerve, Motor Intact to the Median, Ulnar, Radial Nerve, and Pulses Intact    MUSCULOSKELETAL EXAMINATION:  Left hand index and long finger with healed lacerations localized across the finger with nailbed repairs. Chromic sutures intact. Flexor and extensor tendons are intact.  No signs of infection, negative fluctuance, no collection.  No erythema, decreased range of motion secondary to pain.  No discharge in the office.  No instability noted.  FDP and FPL intact    _____________________________________________________  STUDIES REVIEWED:  Left index finger x-rays reviewed by Dr. Huffman today and demonstrates tuft fracture in acceptable alignment and position with progressive healing of fracture.       PROCEDURES PERFORMED:  Procedures  No Procedures performed today  Scribe Attestation      I,:  Melony Christensen MA am acting as a scribe while in the presence of the attending physician.:       I,:  Preston Huffman MD personally performed the services described in this documentation    as scribed in my presence.:             "

## 2025-01-15 ENCOUNTER — TELEMEDICINE (OUTPATIENT)
Dept: BEHAVIORAL/MENTAL HEALTH CLINIC | Facility: CLINIC | Age: 50
End: 2025-01-15
Payer: COMMERCIAL

## 2025-01-15 DIAGNOSIS — F43.10 POST TRAUMATIC STRESS DISORDER (PTSD): ICD-10-CM

## 2025-01-15 DIAGNOSIS — F90.9 ATTENTION DEFICIT HYPERACTIVITY DISORDER (ADHD), UNSPECIFIED ADHD TYPE: ICD-10-CM

## 2025-01-15 DIAGNOSIS — F41.1 GAD (GENERALIZED ANXIETY DISORDER): Primary | ICD-10-CM

## 2025-01-15 PROCEDURE — 90791 PSYCH DIAGNOSTIC EVALUATION: CPT | Performed by: BEHAVIOR ANALYST
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
## 2025-01-27 ENCOUNTER — TELEPHONE (OUTPATIENT)
Dept: INTERNAL MEDICINE CLINIC | Facility: CLINIC | Age: 50
End: 2025-01-27

## 2025-01-27 ENCOUNTER — OFFICE VISIT (OUTPATIENT)
Dept: INTERNAL MEDICINE CLINIC | Facility: CLINIC | Age: 50
End: 2025-01-27
Payer: COMMERCIAL

## 2025-01-27 VITALS
BODY MASS INDEX: 33.39 KG/M2 | DIASTOLIC BLOOD PRESSURE: 100 MMHG | SYSTOLIC BLOOD PRESSURE: 140 MMHG | WEIGHT: 195.6 LBS | HEART RATE: 99 BPM | HEIGHT: 64 IN | TEMPERATURE: 98.5 F | OXYGEN SATURATION: 97 %

## 2025-01-27 DIAGNOSIS — E89.40 SURGICAL MENOPAUSE ON HORMONE REPLACEMENT THERAPY: ICD-10-CM

## 2025-01-27 DIAGNOSIS — F41.1 GAD (GENERALIZED ANXIETY DISORDER): ICD-10-CM

## 2025-01-27 DIAGNOSIS — R73.01 IFG (IMPAIRED FASTING GLUCOSE): ICD-10-CM

## 2025-01-27 DIAGNOSIS — Z12.31 ENCOUNTER FOR SCREENING MAMMOGRAM FOR BREAST CANCER: ICD-10-CM

## 2025-01-27 DIAGNOSIS — B96.89 BACTERIAL SINUSITIS: ICD-10-CM

## 2025-01-27 DIAGNOSIS — I10 ESSENTIAL HYPERTENSION: ICD-10-CM

## 2025-01-27 DIAGNOSIS — Z00.00 ANNUAL PHYSICAL EXAM: Primary | ICD-10-CM

## 2025-01-27 DIAGNOSIS — Z79.890 SURGICAL MENOPAUSE ON HORMONE REPLACEMENT THERAPY: ICD-10-CM

## 2025-01-27 DIAGNOSIS — Z13.6 SCREENING FOR CARDIOVASCULAR CONDITION: ICD-10-CM

## 2025-01-27 DIAGNOSIS — F90.9 ATTENTION DEFICIT HYPERACTIVITY DISORDER (ADHD), UNSPECIFIED ADHD TYPE: ICD-10-CM

## 2025-01-27 DIAGNOSIS — J32.9 BACTERIAL SINUSITIS: ICD-10-CM

## 2025-01-27 PROBLEM — E11.9 DIABETES MELLITUS, TYPE 2 (HCC): Status: RESOLVED | Noted: 2024-09-09 | Resolved: 2025-01-27

## 2025-01-27 LAB — SL AMB POCT HEMOGLOBIN AIC: 5.4 (ref ?–6.5)

## 2025-01-27 PROCEDURE — 99396 PREV VISIT EST AGE 40-64: CPT | Performed by: INTERNAL MEDICINE

## 2025-01-27 PROCEDURE — 83036 HEMOGLOBIN GLYCOSYLATED A1C: CPT | Performed by: INTERNAL MEDICINE

## 2025-01-27 PROCEDURE — 99213 OFFICE O/P EST LOW 20 MIN: CPT | Performed by: INTERNAL MEDICINE

## 2025-01-27 RX ORDER — DULOXETIN HYDROCHLORIDE 20 MG/1
20 CAPSULE, DELAYED RELEASE ORAL DAILY
Qty: 90 CAPSULE | Refills: 1 | Status: SHIPPED | OUTPATIENT
Start: 2025-01-27

## 2025-01-27 RX ORDER — BUPROPION HYDROCHLORIDE 200 MG/1
200 TABLET, EXTENDED RELEASE ORAL 2 TIMES DAILY
Qty: 180 TABLET | Refills: 1 | Status: SHIPPED | OUTPATIENT
Start: 2025-01-27

## 2025-01-27 NOTE — TELEPHONE ENCOUNTER
LM to CB, have a message from PCP Re: Jana.     PCP reviewed patient chart and medication should be taken daily for 3 weeks and ten stop for a week. PCP is comfortable providing a 30 day supply but recommends getting OBGYN as soon as possible.

## 2025-01-27 NOTE — PATIENT INSTRUCTIONS
"-Increase your amlodipine to 10 mg daily  -Referral has been made for you to see gynecology for management of your primary      Patient Education     Routine physical for adults   The Basics   Written by the doctors and editors at [facility]   What is a physical? -- A physical is a routine visit, or \"check-up,\" with your doctor. You might also hear it called a \"wellness visit\" or \"preventive visit.\"  During each visit, the doctor will:   Ask about your physical and mental health   Ask about your habits, behaviors, and lifestyle   Do an exam   Give you vaccines if needed   Talk to you about any medicines you take   Give advice about your health   Answer your questions  Getting regular check-ups is an important part of taking care of your health. It can help your doctor find and treat any problems you have. But it's also important for preventing health problems.  A routine physical is different from a \"sick visit.\" A sick visit is when you see a doctor because of a health concern or problem. Since physicals are scheduled ahead of time, you can think about what you want to ask the doctor.  How often should I get a physical? -- It depends on your age and health. In general, for people age 21 years and older:   If you are younger than 50 years, you might be able to get a physical every 3 years.   If you are 50 years or older, your doctor might recommend a physical every year.  If you have an ongoing health condition, like diabetes or high blood pressure, your doctor will probably want to see you more often.  What happens during a physical? -- In general, each visit will include:   Physical exam - The doctor or nurse will check your height, weight, heart rate, and blood pressure. They will also look at your eyes and ears. They will ask about how you are feeling and whether you have any symptoms that bother you.   Medicines - It's a good idea to bring a list of all the medicines you take to each doctor visit. Your doctor " "will talk to you about your medicines and answer any questions. Tell them if you are having any side effects that bother you. You should also tell them if you are having trouble paying for any of your medicines.   Habits and behaviors - This includes:   Your diet   Your exercise habits   Whether you smoke, drink alcohol, or use drugs   Whether you are sexually active   Whether you feel safe at home  Your doctor will talk to you about things you can do to improve your health and lower your risk of health problems. They will also offer help and support. For example, if you want to quit smoking, they can give you advice and might prescribe medicines. If you want to improve your diet or get more physical activity, they can help you with this, too.   Lab tests, if needed - The tests you get will depend on your age and situation. For example, your doctor might want to check your:   Cholesterol   Blood sugar   Iron level   Vaccines - The recommended vaccines will depend on your age, health, and what vaccines you already had. Vaccines are very important because they can prevent certain serious or deadly infections.   Discussion of screening - \"Screening\" means checking for diseases or other health problems before they cause symptoms. Your doctor can recommend screening based on your age, risk, and preferences. This might include tests to check for:   Cancer, such as breast, prostate, cervical, ovarian, colorectal, prostate, lung, or skin cancer   Sexually transmitted infections, such as chlamydia and gonorrhea   Mental health conditions like depression and anxiety  Your doctor will talk to you about the different types of screening tests. They can help you decide which screenings to have. They can also explain what the results might mean.   Answering questions - The physical is a good time to ask the doctor or nurse questions about your health. If needed, they can refer you to other doctors or specialists, too.  Adults " older than 65 years often need other care, too. As you get older, your doctor will talk to you about:   How to prevent falling at home   Hearing or vision tests   Memory testing   How to take your medicines safely   Making sure that you have the help and support you need at home  All topics are updated as new evidence becomes available and our peer review process is complete.  This topic retrieved from [employer] on: May 02, 2024.  Topic 375282 Version 1.0  Release: 32.4.3 - C32.122  © 2024 UpToDate, Inc. and/or its affiliates. All rights reserved.  Consumer Information Use and Disclaimer   Disclaimer: This generalized information is a limited summary of diagnosis, treatment, and/or medication information. It is not meant to be comprehensive and should be used as a tool to help the user understand and/or assess potential diagnostic and treatment options. It does NOT include all information about conditions, treatments, medications, side effects, or risks that may apply to a specific patient. It is not intended to be medical advice or a substitute for the medical advice, diagnosis, or treatment of a health care provider based on the health care provider's examination and assessment of a patient's specific and unique circumstances. Patients must speak with a health care provider for complete information about their health, medical questions, and treatment options, including any risks or benefits regarding use of medications. This information does not endorse any treatments or medications as safe, effective, or approved for treating a specific patient. UpToDate, Inc. and its affiliates disclaim any warranty or liability relating to this information or the use thereof.The use of this information is governed by the Terms of Use, available at https://www.wolterskluwer.com/en/know/clinical-effectiveness-terms. 2024© UpToDate, Inc. and its affiliates and/or licensors. All rights reserved.  Copyright   © 2024 UpToDate, Inc.  and/or its affiliates. All rights reserved.

## 2025-01-27 NOTE — ASSESSMENT & PLAN NOTE
-Patient reports she has been switched back to Concerta 54 mg twice daily  -Med management per psychiatry  Orders:    DULoxetine (CYMBALTA) 20 mg capsule; Take 1 capsule (20 mg total) by mouth daily    buPROPion (WELLBUTRIN SR) 200 MG 12 hr tablet; Take 1 tablet (200 mg total) by mouth 2 (two) times a day

## 2025-01-27 NOTE — ASSESSMENT & PLAN NOTE
-Blood pressure above goal. Suspect Premarin and Concerta as contributing factors. I have instructed the patient to increase her amlodipine to 10 mg daily.  She will be scheduled to follow-up in 4 weeks for a nurse visit for a blood pressure check.  Orders:    CBC and differential; Future    Comprehensive metabolic panel; Future

## 2025-01-27 NOTE — PROGRESS NOTES
Adult Annual Physical  Name: Paula Rossi      : 1975      MRN: [id number]  Encounter Provider: Brad Orta MD  Encounter Date: 2025   Encounter department: [facility]    Assessment & Plan  Annual physical exam    Orders:    CBC and differential; Future    Comprehensive metabolic panel; Future    Lipid Panel with Direct LDL reflex; Future    IFG (impaired fasting glucose)  -A1c well within normal range  -Will continue metformin 750 mg daily.  Patient reports last time she was taken off of this she gained significant weight and would like to continue it.    Lab Results   Component Value Date    HGBA1C 5.4 2025     Orders:    POCT hemoglobin A1c    Essential hypertension  -Blood pressure above goal. Suspect Premarin and Concerta as contributing factors. I have instructed the patient to increase her amlodipine to 10 mg daily.  She will be scheduled to follow-up in 4 weeks for a nurse visit for a blood pressure check.  Orders:    CBC and differential; Future    Comprehensive metabolic panel; Future    Surgical menopause on hormone replacement therapy  -Patient reports her previous gynecologist Dr. Aguilar who was managing her Premarin has closed his offices in the area.  She states she is running out of her medication.  Prescription has been sent to her pharmacy.  I have also put in an ASAP referral for her to re-establish with our gynecology group for further management of this medication.  Orders:    Ambulatory Referral to Obstetrics / Gynecology; Future    conjugated estrogens (PREMARIN) 0.625 mg tablet; Take 1 tablet (0.625 mg total) by mouth daily for 21 days followed by 7 days off of therapy.    ROMULO (generalized anxiety disorder)  -Stable  -Continue current doses of Cymbalta and Wellbutrin  -Appreciate psychiatry follow-up  Orders:    DULoxetine (CYMBALTA) 20 mg capsule; Take 1 capsule (20 mg total) by mouth daily    buPROPion (WELLBUTRIN SR) 200 MG 12 hr  tablet; Take 1 tablet (200 mg total) by mouth 2 (two) times a day    Attention deficit hyperactivity disorder (ADHD), unspecified ADHD type  -Patient reports she has been switched back to Concerta 54 mg twice daily  -Med management per psychiatry  Orders:    DULoxetine (CYMBALTA) 20 mg capsule; Take 1 capsule (20 mg total) by mouth daily    buPROPion (WELLBUTRIN SR) 200 MG 12 hr tablet; Take 1 tablet (200 mg total) by mouth 2 (two) times a day    Encounter for screening mammogram for breast cancer    Orders:    Mammo screening bilateral w 3d and cad; Future    Bacterial sinusitis  -Start Augmentin twice daily x 7 days  -Take Mucinex/Robitussin DM for cough and congestion  -Administer a saline nasal spray as needed for sinus congestion    Orders:    amoxicillin-clavulanate (AUGMENTIN) 875-125 mg per tablet; Take 1 tablet by mouth every 12 (twelve) hours for 7 days    Screening for cardiovascular condition    Orders:    Lipid Panel with Direct LDL reflex; Future      Immunizations and preventive care screenings were discussed with patient today. Appropriate education was printed on patient's after visit summary.         History of Present Illness     Adult Annual Physical  Patient presents today for an annual physical.  Today she reports she has been feeling sick over the past week.  She endorses cough, congestion and sinus pain.  She has been taking TheraFlu with only slight improvement.    Her history is also notable for surgically induced menopause.  She states she has been maintained on 0.625 milligrams daily.  She states her previous gynecologist Dr. Aguilar has close down his practice in the area.  She is requesting a referral to establish with one of our gynecologist.  Ultimately, she would like to come off of Premarin.    Review of Systems  All other systems negative except for pertinent findings noted in HPI.         Objective   /100 (BP Location: Left arm, Patient Position: Sitting, Cuff Size:  "Standard)   Pulse 99   Temp 98.5 °F (36.9 °C) (Probe)   Ht 5' 4\" (1.626 m)   Wt 88.7 kg (195 lb 9.6 oz)   SpO2 97%   BMI 33.57 kg/m²     Physical Exam  General: NAD  HEENT: NCAT, EOMI, normal conjunctiva  Cardiovascular: RRR, normal S1 and S2, no m/r/g  Pulmonary: Normal respiratory effort, no wheezes, rales or rhonchi  GI: Soft, nontender, nondistended, normoactive bowel sounds  Musculoskeletal: Normal bulk and tone  Neuro: Non-focal, ambulating without difficulty, non-antalgic gait  Extremities: No lower extremity edema  Skin: Normal skin color, no rashes   Psychiatric: Normal mood and affect      "

## 2025-01-27 NOTE — ASSESSMENT & PLAN NOTE
-Stable  -Continue current doses of Cymbalta and Wellbutrin  -Appreciate psychiatry follow-up  Orders:    DULoxetine (CYMBALTA) 20 mg capsule; Take 1 capsule (20 mg total) by mouth daily    buPROPion (WELLBUTRIN SR) 200 MG 12 hr tablet; Take 1 tablet (200 mg total) by mouth 2 (two) times a day

## 2025-01-27 NOTE — ASSESSMENT & PLAN NOTE
-A1c well within normal range  -Will continue metformin 750 mg daily.  Patient reports last time she was taken off of this she gained significant weight and would like to continue it.    Lab Results   Component Value Date    HGBA1C 5.4 01/27/2025     Orders:    POCT hemoglobin A1c

## 2025-01-28 ENCOUNTER — TELEPHONE (OUTPATIENT)
Age: 50
End: 2025-01-28

## 2025-01-28 DIAGNOSIS — E89.40 SURGICAL MENOPAUSE ON HORMONE REPLACEMENT THERAPY: ICD-10-CM

## 2025-01-28 DIAGNOSIS — Z79.890 SURGICAL MENOPAUSE ON HORMONE REPLACEMENT THERAPY: ICD-10-CM

## 2025-01-28 NOTE — TELEPHONE ENCOUNTER
Asap referral placed for HRT, this is non urgent and changed to routine. I left message for pt to cb, she can be routed to my teams number if she has questions or concerns.

## 2025-01-28 NOTE — TELEPHONE ENCOUNTER
Pt called back and needs 30 day supply to last her till the 24th of February when her apt is. [facility] [address] Thank you

## 2025-01-29 ENCOUNTER — TELEMEDICINE (OUTPATIENT)
Dept: BEHAVIORAL/MENTAL HEALTH CLINIC | Facility: CLINIC | Age: 50
End: 2025-01-29
Payer: COMMERCIAL

## 2025-01-29 DIAGNOSIS — Z79.890 SURGICAL MENOPAUSE ON HORMONE REPLACEMENT THERAPY: ICD-10-CM

## 2025-01-29 DIAGNOSIS — F43.10 POST TRAUMATIC STRESS DISORDER (PTSD): ICD-10-CM

## 2025-01-29 DIAGNOSIS — F32.A DEPRESSIVE DISORDER: ICD-10-CM

## 2025-01-29 DIAGNOSIS — F41.1 GAD (GENERALIZED ANXIETY DISORDER): Primary | ICD-10-CM

## 2025-01-29 DIAGNOSIS — F90.9 ATTENTION DEFICIT HYPERACTIVITY DISORDER (ADHD), UNSPECIFIED ADHD TYPE: ICD-10-CM

## 2025-01-29 DIAGNOSIS — E89.40 SURGICAL MENOPAUSE ON HORMONE REPLACEMENT THERAPY: ICD-10-CM

## 2025-01-29 PROCEDURE — 90837 PSYTX W PT 60 MINUTES: CPT | Performed by: BEHAVIOR ANALYST

## 2025-01-29 NOTE — PSYCH
"Behavioral Health Psychotherapy Progress Note    Psychotherapy Provided: Individual Psychotherapy     1. ROMULO (generalized anxiety disorder)        2. Attention deficit hyperactivity disorder (ADHD), unspecified ADHD type        3. Post traumatic stress disorder (PTSD)        4. Depressive disorder            Goals addressed in session: Goal 1 and Goal 2     DATA: This was the first session with Paula Therapist asked open ended questions to allow them to explore their history and current situation. Therapist used active listening to gather information about their needs and build a therapeutic relationships. Functioning within the areas of work, family, social and self care were reviewed. Paula was teary as she talked about her experiences and what she would like to accomplish in therapy. Therapist and Paula collaborated to complete treatment plan and goals were set. Safety plan was also completed.   During this session, this clinician used the following therapeutic modalities: Client-centered Therapy, Cognitive Processing Therapy, and Solution-Focused Therapy    Substance Abuse was not addressed during this session. If the client is diagnosed with a co-occurring substance use disorder, please indicate any changes in the frequency or amount of use: none. Stage of change for addressing substance use diagnoses: No substance use/Not applicable    ASSESSMENT:  Paula Rossi presents with a Labile mood.     her affect is Bright and Tearful, which is congruent, with her mood and the content of the session. The client has not made progress on their goals.    Paula should increase self-care and mindfulness as it would be beneficial. There is motivation to make progress toward goals.  Paula Rossi presents with a none risk of suicide, none risk of self-harm, and none risk of harm to others.    For any risk assessment that surpasses a \"low\" rating, a safety plan must be developed.    A safety plan was indicated: no  If yes, " describe in detail n/a    PLAN: Between sessions, Paula Rossi will continue to build a therapeutic relationship with therapist. She will be encouraged to openly share her thoughts and feelings. She will be supported in recognizing and utilizing her strengths and coping skills. She will be encouraged to use self awareness and self care. At the next session, the therapist will use Client-centered Therapy, Cognitive Processing Therapy, Mindfulness-based Strategies, and Solution-Focused Therapy to address anxiety and emotional regulation.    Behavioral Health Treatment Plan and Discharge Planning: Paula Rossi is aware of and agrees to continue to work on their treatment plan. They have identified and are working toward their discharge goals. yes    Depression Follow-up Plan Completed: Not applicable    Visit start and stop times:    01/29/25  Start Time: 0801  Stop Time: 0855  Total Visit Time: 54 minutes

## 2025-01-29 NOTE — BH TREATMENT PLAN
Outpatient Behavioral Health Psychotherapy Treatment Plan    Paula Rossi  1975     Date of Initial Psychotherapy Assessment: 1/15/25   Date of Current Treatment Plan: 01/29/25  Treatment Plan Target Date: 6/29/25   Treatment Plan Expiration Date: 7/29/25    Diagnosis:   1. ROMULO (generalized anxiety disorder)        2. Attention deficit hyperactivity disorder (ADHD), unspecified ADHD type        3. Post traumatic stress disorder (PTSD)        4. Depressive disorder            Area(s) of Need: Trauma and interpersonal issues, anxiety and sleep     Long Term Goal 1 (in the client's own words): I want to be more in tune with my emotions and manage them well     Stage of Change: Preparation    Target Date for completion: 6/29/25     Anticipated therapeutic modalities: client centered, CBT, DBT, mindfulness     People identified to complete this goal: myself, therapist      Objective 1: (identify the means of measuring success in meeting the objective): Paula will identify 3 triggers for her emotional responses      Objective 2: (identify the means of measuring success in meeting the objective): Paula will learn 3 strategies to communicate her emotions and needs with others      Long Term Goal 2 (in the client's own words): I want to be less anxious and more mindful    Stage of Change: Preparation    Target Date for completion: 6/29/25     Anticipated therapeutic modalities: client centered, CBT, DBT, mindfulness      People identified to complete this goal: myself and therapist      Objective 1: (identify the means of measuring success in meeting the objective): Paula will identify 1 anxious/negative  thought per session and work to reframe or challenge that thought.       Objective 2: (identify the means of measuring success in meeting the objective): Paula will learn 2 strategies to be more mindful and improve health        I am currently under the care of a [facility] psychiatric provider: no    My [facility]  psychiatric provider is: n/a    I am currently taking psychiatric medications: Yes, as prescribed    I feel that I will be ready for discharge from mental health care when I reach the following (measurable goal/objective): when I am able to identify and process my emotions and when I am able to love myself and not let others influence things I do. I want to understand myself and what I want out of life.     For children and adults who have a legal guardian:   Has there been any change to custody orders and/or guardianship status? NA. If yes, attach updated documentation.    I have created my Crisis Plan and have been offered a copy of this plan    Behavioral Health Treatment Plan [facility]: Diagnosis and Treatment Plan explained to Paula Rossi acknowledges an understanding of their diagnosis. Paula Rossi agrees to this treatment plan.    I have been offered a copy of this Treatment Plan. yes

## 2025-02-02 ENCOUNTER — APPOINTMENT (EMERGENCY)
Dept: CT IMAGING | Facility: HOSPITAL | Age: 50
End: 2025-02-02
Payer: COMMERCIAL

## 2025-02-02 ENCOUNTER — HOSPITAL ENCOUNTER (EMERGENCY)
Facility: HOSPITAL | Age: 50
Discharge: HOME | End: 2025-02-02
Attending: EMERGENCY MEDICINE
Payer: COMMERCIAL

## 2025-02-02 VITALS
SYSTOLIC BLOOD PRESSURE: 147 MMHG | BODY MASS INDEX: 32.44 KG/M2 | WEIGHT: 190 LBS | DIASTOLIC BLOOD PRESSURE: 83 MMHG | TEMPERATURE: 98.4 F | OXYGEN SATURATION: 96 % | RESPIRATION RATE: 24 BRPM | HEIGHT: 64 IN | HEART RATE: 98 BPM

## 2025-02-02 DIAGNOSIS — F10.929 ALCOHOL INTOXICATION (HCC): ICD-10-CM
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
## 2025-02-02 DIAGNOSIS — R91.1 PULMONARY NODULE: ICD-10-CM

## 2025-02-02 PROCEDURE — 70450 CT HEAD/BRAIN W/O DYE: CPT

## 2025-02-02 PROCEDURE — 99285 EMERGENCY DEPT VISIT HI MDM: CPT | Performed by: EMERGENCY MEDICINE

## 2025-02-02 PROCEDURE — 72125 CT NECK SPINE W/O DYE: CPT

## 2025-02-02 PROCEDURE — 93005 ELECTROCARDIOGRAM TRACING: CPT

## 2025-02-02 PROCEDURE — 99284 EMERGENCY DEPT VISIT MOD MDM: CPT

## 2025-02-02 PROCEDURE — 74177 CT ABD & PELVIS W/CONTRAST: CPT

## 2025-02-02 PROCEDURE — 71260 CT THORAX DX C+: CPT

## 2025-02-02 RX ADMIN — IOHEXOL 100 ML: 350 INJECTION, SOLUTION INTRAVENOUS at 01:54
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
## 2025-02-02 NOTE — INCIDENTAL FINDINGS
The following findings require follow up:  Radiographic finding   Finding: Pulmonary nodule   Follow up required: Noncontrast CT scan of the chest   Follow up should be done within 6-12 month(s)     You were also found to have Slightly prominent soft tissue density in the left  breast. Follow-up with mammogram is recommended.    Please notify the following clinician to assist with the follow up:   Dr. Brad Orta       Incidental finding results were discussed with the Patient by Felipe Child MD on 02/02/25.   They expressed understanding and all questions answered.

## 2025-02-03 ENCOUNTER — PATIENT MESSAGE (OUTPATIENT)
Dept: INTERNAL MEDICINE CLINIC | Facility: CLINIC | Age: 50
End: 2025-02-03

## 2025-02-03 PROBLEM — R91.1 LUNG NODULE: Status: ACTIVE | Noted: 2025-02-03

## 2025-02-03 PROBLEM — R92.30 BREAST DENSITY: Status: ACTIVE | Noted: 2025-02-03

## 2025-02-03 LAB
ATRIAL RATE: 92 BPM
P AXIS: 67 DEGREES
PR INTERVAL: 172 MS
QRS AXIS: 83 DEGREES
QRSD INTERVAL: 82 MS
QT INTERVAL: 366 MS
QTC INTERVAL: 452 MS
T WAVE AXIS: 78 DEGREES
VENTRICULAR RATE: 92 BPM

## 2025-02-03 PROCEDURE — 93010 ELECTROCARDIOGRAM REPORT: CPT | Performed by: INTERNAL MEDICINE

## 2025-02-05 ENCOUNTER — HOSPITAL ENCOUNTER (OUTPATIENT)
Dept: MAMMOGRAPHY | Facility: HOSPITAL | Age: 50
Discharge: HOME | End: 2025-02-05
Attending: INTERNAL MEDICINE

## 2025-02-05 ENCOUNTER — TELEPHONE (OUTPATIENT)
Dept: INTERNAL MEDICINE CLINIC | Facility: CLINIC | Age: 50
End: 2025-02-05

## 2025-02-05 VITALS — BODY MASS INDEX: 32.44 KG/M2 | HEIGHT: 64 IN | WEIGHT: 190 LBS

## 2025-02-05 DIAGNOSIS — Z12.31 ENCOUNTER FOR SCREENING MAMMOGRAM FOR BREAST CANCER: ICD-10-CM

## 2025-02-05 NOTE — TELEPHONE ENCOUNTER
Notify patient she can check to see if they have earlier availability at any of the other [facility]'s sites within the [facility].  She could also consider contacting [facility] to see if they have any openings.

## 2025-02-05 NOTE — TELEPHONE ENCOUNTER
Pt already made an appointment with [facility] for Friday 02/07/2025, pt was advise if disc is really needed for the appt she should call [facility] medical records to get a copy of the images needed per Dr Grier.

## 2025-02-05 NOTE — PATIENT COMMUNICATION
Patient called stating she was unable to get her mammogram done today due to an issue with her order. She stated it needed to be a diagnostic, not routine. When she rescheduled, they could not get her in again until March 27th. Patient is wondering if there's anywhere else she can go where she might be able to get this done sooner? Please call patient back with information.

## 2025-02-06 ENCOUNTER — TELEPHONE (OUTPATIENT)
Dept: INTERNAL MEDICINE CLINIC | Facility: CLINIC | Age: 50
End: 2025-02-06

## 2025-02-07 NOTE — TELEPHONE ENCOUNTER
PA conjugated estrogens (PREMARIN) 0.625 mg APPROVED         Patient advised by          []MyChart Message  [x]Phone call   []LMOM  []L/M to call office as no active Communication consent on file  []Unable to leave detailed message as VM not approved on Communication consent       Pharmacy advised by    [x]Fax  []Phone call

## 2025-02-07 NOTE — TELEPHONE ENCOUNTER
PA conjugated estrogens (PREMARIN) 0.625 mg SUBMITTED     to [employer]     via    []CMM-KEY:    [x]Surescripts-Case ID # [id number]   []Availity-Auth ID #  NDC #    []Faxed to plan   []Other website    []Phone call Case ID #      []PA sent as URGENT    All office notes, labs and other pertaining documents and studies sent. Clinical questions answered. Awaiting determination from insurance company.     Turnaround time for your insurance to make a decision on your Prior Authorization can take 7-21 business days.

## 2025-02-12 ENCOUNTER — TELEMEDICINE (OUTPATIENT)
Dept: BEHAVIORAL/MENTAL HEALTH CLINIC | Facility: CLINIC | Age: 50
End: 2025-02-12
Payer: COMMERCIAL

## 2025-02-12 DIAGNOSIS — F32.A DEPRESSIVE DISORDER: ICD-10-CM

## 2025-02-12 DIAGNOSIS — F41.1 GAD (GENERALIZED ANXIETY DISORDER): Primary | ICD-10-CM

## 2025-02-12 DIAGNOSIS — F90.9 ATTENTION DEFICIT HYPERACTIVITY DISORDER (ADHD), UNSPECIFIED ADHD TYPE: ICD-10-CM

## 2025-02-12 PROCEDURE — 90837 PSYTX W PT 60 MINUTES: CPT | Performed by: BEHAVIOR ANALYST
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
## 2025-02-17 DIAGNOSIS — Z79.890 SURGICAL MENOPAUSE ON HORMONE REPLACEMENT THERAPY: ICD-10-CM

## 2025-02-17 DIAGNOSIS — E89.40 SURGICAL MENOPAUSE ON HORMONE REPLACEMENT THERAPY: ICD-10-CM

## 2025-02-17 NOTE — TELEPHONE ENCOUNTER
Reason for call:   [x] Refill   [] Prior Auth  [] Other:     Office:   [x] PCP/Provider -   [] Specialty/Provider -     Medication: Premarin 0.625 mg, take 1 tablet by mouth daily for 21 days       Pharmacy: [facility]     Does the patient have enough for 3 days?   [] Yes   [x] No - Send as HP to POD

## 2025-02-18 ENCOUNTER — TELEMEDICINE (OUTPATIENT)
Dept: BEHAVIORAL/MENTAL HEALTH CLINIC | Facility: CLINIC | Age: 50
End: 2025-02-18
Payer: COMMERCIAL

## 2025-02-18 DIAGNOSIS — F32.A DEPRESSIVE DISORDER: ICD-10-CM

## 2025-02-18 DIAGNOSIS — F41.1 GAD (GENERALIZED ANXIETY DISORDER): Primary | ICD-10-CM

## 2025-02-18 DIAGNOSIS — F90.9 ATTENTION DEFICIT HYPERACTIVITY DISORDER (ADHD), UNSPECIFIED ADHD TYPE: ICD-10-CM

## 2025-02-18 PROCEDURE — 90837 PSYTX W PT 60 MINUTES: CPT | Performed by: BEHAVIOR ANALYST

## 2025-02-18 NOTE — PSYCH
"Behavioral Health Psychotherapy Progress Note    Psychotherapy Provided: Individual Psychotherapy     1. ROMULO (generalized anxiety disorder)        2. Attention deficit hyperactivity disorder (ADHD), unspecified ADHD type        3. Depressive disorder            Goals addressed in session: Goal 1 and Goal 2     DATA: Paula attended her session today and stated that she had a few interactions that have been upsetting. Therapist asked open ended questions to allow her to explore the interactions and circumstances surrounding them. Paula stated that her sister was in the area and they met for dinner. Therapist used active listening to support Paula as she processed her history with her sister and feelings of frustration because she feels her sister is \"mean and bitter.\" When asked to identify why she feels this way, Paula stated that her sister pushes her to be more aggressive with her divorce and with the way she handles her boyfriend. Paula also stated that her friends have also told her that she is not a priority for her boyfriend. Therapist used MI to guide Paula in exploring her goals and expectations with her relationship and how she can communicate/reach those goals. She stated that she drank bourbon Sunday night and sent him a \"rude text\" telling him to lose her number but they have since then talked about things a little.   During this session, this clinician used the following therapeutic modalities: Client-centered Therapy, Cognitive Processing Therapy, and Motivational Interviewing    Substance Abuse was addressed during this session. If the client is diagnosed with a co-occurring substance use disorder, please indicate any changes in the frequency or amount of use: \"I don't drink alone and I don't drive while drinking.\" . Stage of change for addressing substance use diagnoses: Pre-contemplation    ASSESSMENT:  Paula Rossi presents with a Euthymic/ normal mood.     her affect is Normal range and " "intensity, which is congruent, with her mood and the content of the session. The client has not made progress on their goals.    Paula should increase self-care and mindfulness as it would be beneficial. There is motivation to make progress toward goals and work done to continue to move forward. Paula Rossi presents with a none risk of suicide, none risk of self-harm, and none risk of harm to others.    For any risk assessment that surpasses a \"low\" rating, a safety plan must be developed.    A safety plan was indicated: no  If yes, describe in detail n/a    PLAN: Between sessions, Paula Rossi will continue to build a therapeutic relationship with therapist. She will be encouraged to openly share her thoughts and feelings. She will be supported in recognizing and utilizing her strengths and coping skills. She will be encouraged to use self awareness and self care . At the next session, the therapist will use Client-centered Therapy, Cognitive Processing Therapy, and Motivational Interviewing to address mood.    Behavioral Health Treatment Plan and Discharge Planning: Paula Rossi is aware of and agrees to continue to work on their treatment plan. They have identified and are working toward their discharge goals. yes    Depression Follow-up Plan Completed: Not applicable    Visit start and stop times:    02/18/25  Start Time: 0759  Stop Time: 0858  Total Visit Time: 59 minutes  "

## 2025-02-28 DIAGNOSIS — Z79.890 SURGICAL MENOPAUSE ON HORMONE REPLACEMENT THERAPY: ICD-10-CM

## 2025-02-28 DIAGNOSIS — E89.40 SURGICAL MENOPAUSE ON HORMONE REPLACEMENT THERAPY: ICD-10-CM

## 2025-03-06 DIAGNOSIS — F90.9 ATTENTION DEFICIT HYPERACTIVITY DISORDER (ADHD), UNSPECIFIED ADHD TYPE: Primary | ICD-10-CM

## 2025-03-07 ENCOUNTER — TELEPHONE (OUTPATIENT)
Age: 50
End: 2025-03-07

## 2025-03-07 RX ORDER — METHYLPHENIDATE HYDROCHLORIDE 54 MG/1
54 TABLET ORAL 2 TIMES DAILY
Qty: 6 TABLET | Refills: 0 | Status: SHIPPED | OUTPATIENT
Start: 2025-03-07

## 2025-03-07 NOTE — TELEPHONE ENCOUNTER
Reason for call:   [x] Prior Auth  [] Other:     Caller:  [] Patient  [x] Pharmacy  Name: [facility]   Address: [facility] [address] [facility]  Callback Number: [phone]    Medication: methylphenidate (CONCERTA) 54 MG ER tablet     Dose/Frequency: 1 tablet (54 mg) by mouth 2 times a day    Quantity:     Ordering Provider:   [x] PCP/Provider - Dr. Orta   [] Speciality/Provider -     Has the patient tried other medications and failed? If failed, which medications did they fail?    [] No   [] Yes -     Is the patient's insurance updated in EPIC?   [x] Yes   [] No     Is a copy of the patient's insurance scanned in EPIC?   [] Yes   [] No

## 2025-03-11 ENCOUNTER — TELEMEDICINE (OUTPATIENT)
Dept: BEHAVIORAL/MENTAL HEALTH CLINIC | Facility: CLINIC | Age: 50
End: 2025-03-11
Payer: COMMERCIAL

## 2025-03-11 DIAGNOSIS — F90.9 ATTENTION DEFICIT HYPERACTIVITY DISORDER (ADHD), UNSPECIFIED ADHD TYPE: ICD-10-CM

## 2025-03-11 DIAGNOSIS — F41.1 GAD (GENERALIZED ANXIETY DISORDER): ICD-10-CM

## 2025-03-11 DIAGNOSIS — F32.A DEPRESSIVE DISORDER: Primary | ICD-10-CM

## 2025-03-11 PROCEDURE — 90837 PSYTX W PT 60 MINUTES: CPT | Performed by: BEHAVIOR ANALYST
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
## 2025-03-17 DIAGNOSIS — J31.0 CHRONIC RHINITIS: Primary | ICD-10-CM

## 2025-03-18 RX ORDER — FLUTICASONE PROPIONATE 50 MCG
2 SPRAY, SUSPENSION (ML) NASAL AS NEEDED
Qty: 9.9 ML | Refills: 0 | Status: SHIPPED | OUTPATIENT
Start: 2025-03-18

## 2025-03-19 NOTE — PROGRESS NOTES
Pre-charting for Appointment      Reason for being seen today: Menopause    Any current testing/imaging done prior to exam today:

## 2025-03-20 ENCOUNTER — OFFICE VISIT (OUTPATIENT)
Dept: OBGYN CLINIC | Facility: CLINIC | Age: 50
End: 2025-03-20
Payer: COMMERCIAL

## 2025-03-20 VITALS
HEIGHT: 64 IN | SYSTOLIC BLOOD PRESSURE: 152 MMHG | BODY MASS INDEX: 32.85 KG/M2 | WEIGHT: 192.4 LBS | DIASTOLIC BLOOD PRESSURE: 106 MMHG

## 2025-03-20 DIAGNOSIS — Z01.419 ENCOUNTER FOR GYNECOLOGICAL EXAMINATION WITHOUT ABNORMAL FINDING: Primary | ICD-10-CM

## 2025-03-20 DIAGNOSIS — Z79.890 SURGICAL MENOPAUSE ON HORMONE REPLACEMENT THERAPY: ICD-10-CM

## 2025-03-20 DIAGNOSIS — R32 URINARY INCONTINENCE, UNSPECIFIED TYPE: ICD-10-CM

## 2025-03-20 DIAGNOSIS — Z12.31 SCREENING MAMMOGRAM, ENCOUNTER FOR: ICD-10-CM

## 2025-03-20 DIAGNOSIS — E89.40 SURGICAL MENOPAUSE: ICD-10-CM

## 2025-03-20 DIAGNOSIS — E89.40 SURGICAL MENOPAUSE ON HORMONE REPLACEMENT THERAPY: ICD-10-CM

## 2025-03-20 PROCEDURE — 99386 PREV VISIT NEW AGE 40-64: CPT | Performed by: OBSTETRICS & GYNECOLOGY

## 2025-03-20 PROCEDURE — G0476 HPV COMBO ASSAY CA SCREEN: HCPCS | Performed by: OBSTETRICS & GYNECOLOGY

## 2025-03-20 PROCEDURE — G0145 SCR C/V CYTO,THINLAYER,RESCR: HCPCS | Performed by: OBSTETRICS & GYNECOLOGY

## 2025-03-20 RX ORDER — ESTRADIOL 0.05 MG/D
1 PATCH, EXTENDED RELEASE TRANSDERMAL 2 TIMES WEEKLY
Qty: 8 PATCH | Refills: 3 | Status: SHIPPED | OUTPATIENT
Start: 2025-03-20 | End: 2025-03-24 | Stop reason: SDUPTHER

## 2025-03-20 NOTE — PROGRESS NOTES
"Subjective      Paula Rossi is a 49 y.o. female who presents for annual well woman exam.   Hysterectomy with BSO 2017 se  PCOS and abn pap      Family history of uterine or ovarian cancer: no  Family history of breast cancer: yes - MGM    Menstrual History:  OB History          3    Para   2    Term   2            AB   1    Living             SAB        IAB   1    Ectopic        Multiple        Live Births   2                  No LMP recorded. Patient has had a hysterectomy.       The following portions of the patient's history were reviewed and updated as appropriate: allergies, current medications, past family history, past medical history, past social history, past surgical history, and problem list.    Review of Systems  Review of Systems   Constitutional:  Negative for activity change, appetite change, chills, fatigue and fever.   Respiratory:  Negative for apnea, cough, chest tightness and shortness of breath.    Cardiovascular:  Negative for chest pain, palpitations and leg swelling.   Gastrointestinal:  Negative for abdominal pain, constipation, diarrhea, nausea and vomiting.   Genitourinary:  Negative for difficulty urinating, dysuria, flank pain, frequency, hematuria and urgency.   Neurological:  Negative for dizziness, seizures, syncope, light-headedness, numbness and headaches.   Psychiatric/Behavioral:  Negative for agitation and confusion.           Objective      BP (!) 152/106 (BP Location: Left arm, Patient Position: Sitting, Cuff Size: Adult)   Ht 5' 4\" (1.626 m)   Wt 87.3 kg (192 lb 6.4 oz)   BMI 33.03 kg/m²     Physical Exam  OBGyn Exam     General:   alert and oriented, in no acute distress, alert, appears stated age, and cooperative   Heart: regular rate and rhythm, S1, S2 normal, no murmur, click, rub or gallop   Lungs: clear to auscultation bilaterally   Abdomen: soft, non-tender, without masses or organomegaly   Vulva: normal, Bartholin's, Urethra, Onaga's normal "   Vagina: normal mucosa, normal discharge   Cervix: surgically absent   Uterus: surgically absent   Adnexa:  Breast Exam:  no mass, fullness, tenderness  breasts appear normal, no suspicious masses, no skin or nipple changes or axillary nodes.                Assessment      @well woman@ .  49-year-old female  Annual exam  Had hysterectomy with BSO secondary to abnormal Pap smear and PCOS  Stress urinary incontinence had sling procedure in the past  Mild BERLIN  Chronic hypertension  Depression stable  Diabetes  Plan   Pap/HPV from the vagina  Diet/exercise  Calcium/vitamin D  Patient has no control of her pelvic floor muscle recommend physical therapy referral  Continue estrogen replacement therapy recommend to switch to estrogen patch risk-benefit side effect reviewed and discussed with patient  Mammogram  Colonoscopy up-to-date  Return to office for annual exam   All questions answered.     There are no Patient Instructions on file for this visit.
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
## 2025-03-21 DIAGNOSIS — H60.541 DERMATITIS OF RIGHT EAR CANAL: ICD-10-CM

## 2025-03-21 LAB
HPV HR 12 DNA CVX QL NAA+PROBE: NEGATIVE
HPV16 DNA CVX QL NAA+PROBE: NEGATIVE
HPV18 DNA CVX QL NAA+PROBE: POSITIVE

## 2025-03-21 RX ORDER — MOMETASONE FUROATE 1 MG/G
OINTMENT TOPICAL DAILY
Qty: 45 G | Refills: 0 | Status: SHIPPED | OUTPATIENT
Start: 2025-03-21 | End: 2025-03-21 | Stop reason: SDUPTHER

## 2025-03-21 RX ORDER — MOMETASONE FUROATE 1 MG/G
OINTMENT TOPICAL DAILY
Qty: 45 G | Refills: 0 | Status: SHIPPED | OUTPATIENT
Start: 2025-03-21

## 2025-03-23 ENCOUNTER — RESULTS FOLLOW-UP (OUTPATIENT)
Dept: OBGYN CLINIC | Facility: CLINIC | Age: 50
End: 2025-03-23

## 2025-03-24 ENCOUNTER — TELEPHONE (OUTPATIENT)
Age: 50
End: 2025-03-24

## 2025-03-24 DIAGNOSIS — E89.40 SURGICAL MENOPAUSE: ICD-10-CM

## 2025-03-24 RX ORDER — ESTRADIOL 0.05 MG/D
1 PATCH, EXTENDED RELEASE TRANSDERMAL 2 TIMES WEEKLY
Qty: 8 PATCH | Refills: 3 | Status: SHIPPED | OUTPATIENT
Start: 2025-03-24

## 2025-03-24 NOTE — TELEPHONE ENCOUNTER
Patient calling in, requesting her prescription for Vivelle-Dot be sent to a different pharmacy.  She would like it sent to the [facility] in [place] that is on file, as opposed to the [facility] because they will only fill 3 months at a time.    She also provided a phone number for her old records prior to her hysterectomy to be obtained: [phone] (Dr. Aguilar).

## 2025-03-25 LAB
LAB AP GYN PRIMARY INTERPRETATION: NORMAL
Lab: NORMAL

## 2025-03-26 ENCOUNTER — TELEMEDICINE (OUTPATIENT)
Dept: BEHAVIORAL/MENTAL HEALTH CLINIC | Facility: CLINIC | Age: 50
End: 2025-03-26
Payer: COMMERCIAL

## 2025-03-26 ENCOUNTER — COSMETIC (OUTPATIENT)
Age: 50
End: 2025-03-26

## 2025-03-26 DIAGNOSIS — Z41.1 ELECTIVE PROCEDURE FOR UNACCEPTABLE COSMETIC APPEARANCE: Primary | ICD-10-CM

## 2025-03-26 DIAGNOSIS — F41.1 GAD (GENERALIZED ANXIETY DISORDER): Primary | ICD-10-CM

## 2025-03-26 DIAGNOSIS — F43.10 POST TRAUMATIC STRESS DISORDER (PTSD): ICD-10-CM

## 2025-03-26 DIAGNOSIS — F32.A DEPRESSIVE DISORDER: ICD-10-CM

## 2025-03-26 PROCEDURE — 90837 PSYTX W PT 60 MINUTES: CPT | Performed by: BEHAVIOR ANALYST

## 2025-03-26 PROCEDURE — COSCON COSMETIC CONSULTATION: Performed by: STUDENT IN AN ORGANIZED HEALTH CARE EDUCATION/TRAINING PROGRAM
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
## 2025-03-27 ENCOUNTER — APPOINTMENT (OUTPATIENT)
Dept: LAB | Facility: CLINIC | Age: 50
End: 2025-03-27
Payer: COMMERCIAL

## 2025-03-27 DIAGNOSIS — Z00.00 ANNUAL PHYSICAL EXAM: ICD-10-CM

## 2025-03-27 DIAGNOSIS — I10 ESSENTIAL HYPERTENSION: ICD-10-CM

## 2025-03-27 DIAGNOSIS — Z13.6 SCREENING FOR CARDIOVASCULAR CONDITION: ICD-10-CM

## 2025-03-27 LAB
ALBUMIN SERPL BCG-MCNC: 4.4 G/DL (ref 3.5–5)
ALP SERPL-CCNC: 40 U/L (ref 34–104)
ALT SERPL W P-5'-P-CCNC: 15 U/L (ref 7–52)
ANION GAP SERPL CALCULATED.3IONS-SCNC: 10 MMOL/L (ref 4–13)
AST SERPL W P-5'-P-CCNC: 18 U/L (ref 13–39)
BASOPHILS # BLD AUTO: 0.06 THOUSANDS/ÂΜL (ref 0–0.1)
BASOPHILS NFR BLD AUTO: 1 % (ref 0–1)
BILIRUB SERPL-MCNC: 0.52 MG/DL (ref 0.2–1)
BUN SERPL-MCNC: 20 MG/DL (ref 5–25)
CALCIUM SERPL-MCNC: 9.3 MG/DL (ref 8.4–10.2)
CHLORIDE SERPL-SCNC: 101 MMOL/L (ref 96–108)
CHOLEST SERPL-MCNC: 190 MG/DL (ref ?–200)
CO2 SERPL-SCNC: 29 MMOL/L (ref 21–32)
CREAT SERPL-MCNC: 0.69 MG/DL (ref 0.6–1.3)
EOSINOPHIL # BLD AUTO: 0.28 THOUSAND/ÂΜL (ref 0–0.61)
EOSINOPHIL NFR BLD AUTO: 6 % (ref 0–6)
ERYTHROCYTE [DISTWIDTH] IN BLOOD BY AUTOMATED COUNT: 12.8 % (ref 11.6–15.1)
GFR SERPL CREATININE-BSD FRML MDRD: 101 ML/MIN/1.73SQ M
GLUCOSE P FAST SERPL-MCNC: 96 MG/DL (ref 65–99)
HCT VFR BLD AUTO: 43 % (ref 34.8–46.1)
HDLC SERPL-MCNC: 84 MG/DL
HGB BLD-MCNC: 14.2 G/DL (ref 11.5–15.4)
IMM GRANULOCYTES # BLD AUTO: 0.01 THOUSAND/UL (ref 0–0.2)
IMM GRANULOCYTES NFR BLD AUTO: 0 % (ref 0–2)
LDLC SERPL CALC-MCNC: 90 MG/DL (ref 0–100)
LYMPHOCYTES # BLD AUTO: 1.61 THOUSANDS/ÂΜL (ref 0.6–4.47)
LYMPHOCYTES NFR BLD AUTO: 32 % (ref 14–44)
MCH RBC QN AUTO: 30.6 PG (ref 26.8–34.3)
MCHC RBC AUTO-ENTMCNC: 33 G/DL (ref 31.4–37.4)
MCV RBC AUTO: 93 FL (ref 82–98)
MONOCYTES # BLD AUTO: 0.45 THOUSAND/ÂΜL (ref 0.17–1.22)
MONOCYTES NFR BLD AUTO: 9 % (ref 4–12)
NEUTROPHILS # BLD AUTO: 2.61 THOUSANDS/ÂΜL (ref 1.85–7.62)
NEUTS SEG NFR BLD AUTO: 52 % (ref 43–75)
NRBC BLD AUTO-RTO: 0 /100 WBCS
PLATELET # BLD AUTO: 341 THOUSANDS/UL (ref 149–390)
PMV BLD AUTO: 10.4 FL (ref 8.9–12.7)
POTASSIUM SERPL-SCNC: 4.7 MMOL/L (ref 3.5–5.3)
PROT SERPL-MCNC: 7.2 G/DL (ref 6.4–8.4)
RBC # BLD AUTO: 4.64 MILLION/UL (ref 3.81–5.12)
SODIUM SERPL-SCNC: 140 MMOL/L (ref 135–147)
TRIGL SERPL-MCNC: 79 MG/DL (ref ?–150)
WBC # BLD AUTO: 5.02 THOUSAND/UL (ref 4.31–10.16)

## 2025-03-27 PROCEDURE — 85025 COMPLETE CBC W/AUTO DIFF WBC: CPT

## 2025-03-27 PROCEDURE — 80053 COMPREHEN METABOLIC PANEL: CPT

## 2025-03-27 PROCEDURE — 36415 COLL VENOUS BLD VENIPUNCTURE: CPT

## 2025-03-27 PROCEDURE — 80061 LIPID PANEL: CPT
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
## 2025-03-28 ENCOUNTER — RESULTS FOLLOW-UP (OUTPATIENT)
Dept: INTERNAL MEDICINE CLINIC | Facility: CLINIC | Age: 50
End: 2025-03-28

## 2025-04-11 DIAGNOSIS — I10 ESSENTIAL HYPERTENSION: ICD-10-CM

## 2025-04-11 RX ORDER — AMLODIPINE BESYLATE 10 MG/1
10 TABLET ORAL DAILY
Qty: 90 TABLET | Refills: 0 | Status: SHIPPED | OUTPATIENT
Start: 2025-04-11

## 2025-04-11 NOTE — TELEPHONE ENCOUNTER
Reason for call:   [x] Refill   [] Prior Auth  [] Other:     Office:   [x] PCP/Provider -  Brad Orta MD   [] Specialty/Provider -     Medication: amLODIPine 10 mg    Dose/Frequency: 1 tab daily     Quantity: 90 tabs    Pharmacy: [id number] - [place], [place] - [address] [place] [address]   Does the patient have enough for 3 days?   [x] Yes   [] No - Send as HP to POD    Mail Away Pharmacy   Does the patient have enough for 10 days?   [] Yes   [] No - Send as HP to POD

## 2025-04-15 ENCOUNTER — TELEMEDICINE (OUTPATIENT)
Dept: BEHAVIORAL/MENTAL HEALTH CLINIC | Facility: CLINIC | Age: 50
End: 2025-04-15
Payer: COMMERCIAL

## 2025-04-15 DIAGNOSIS — F41.1 GAD (GENERALIZED ANXIETY DISORDER): Primary | ICD-10-CM

## 2025-04-15 DIAGNOSIS — F32.A DEPRESSIVE DISORDER: ICD-10-CM

## 2025-04-15 DIAGNOSIS — F90.9 ATTENTION DEFICIT HYPERACTIVITY DISORDER (ADHD), UNSPECIFIED ADHD TYPE: ICD-10-CM

## 2025-04-15 PROCEDURE — 90834 PSYTX W PT 45 MINUTES: CPT | Performed by: BEHAVIOR ANALYST
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
## 2025-04-21 ENCOUNTER — TELEPHONE (OUTPATIENT)
Age: 50
End: 2025-04-21

## 2025-04-21 DIAGNOSIS — E89.41 HOT FLASHES DUE TO SURGICAL MENOPAUSE: ICD-10-CM

## 2025-04-21 DIAGNOSIS — E89.41 HOT FLASHES DUE TO SURGICAL MENOPAUSE: Primary | ICD-10-CM

## 2025-04-21 RX ORDER — ESTRADIOL 0.07 MG/D
1 FILM, EXTENDED RELEASE TRANSDERMAL 2 TIMES WEEKLY
Qty: 8 PATCH | Refills: 3 | Status: SHIPPED | OUTPATIENT
Start: 2025-04-21

## 2025-04-21 RX ORDER — ESTRADIOL 0.07 MG/D
1 FILM, EXTENDED RELEASE TRANSDERMAL 2 TIMES WEEKLY
Qty: 8 PATCH | Refills: 3 | Status: SHIPPED | OUTPATIENT
Start: 2025-04-21 | End: 2025-04-21 | Stop reason: SDUPTHER

## 2025-04-21 RX ORDER — ESTRADIOL 0.07 MG/D
1 FILM, EXTENDED RELEASE TRANSDERMAL 2 TIMES WEEKLY
Qty: 8 PATCH | Refills: 3 | Status: CANCELLED | OUTPATIENT
Start: 2025-04-21

## 2025-04-21 NOTE — TELEPHONE ENCOUNTER
Patient previously on premarin, was changed to estradiol patch 0.05. Patient started patch on Saturday 4/19 and patient states that she has been having night sweats, symptoms that she did not have on premarin. Patient inquiring if estradiol dose can be increased. Routing to provider for review.     If increased, patient would like new RX sent to [email or web address] on file in preferred pharmacy list.

## 2025-04-21 NOTE — TELEPHONE ENCOUNTER
A user error has taken place: encounter opened in error, closed for administrative reasons.  Att. A different msg

## 2025-04-21 NOTE — TELEPHONE ENCOUNTER
Patient called back and said the patch was not sent to the correct pharmacy. [facility]. Patient was going into an appt and couldn't stay on phone.  Thank You

## 2025-04-22 DIAGNOSIS — J31.0 CHRONIC RHINITIS: ICD-10-CM

## 2025-04-22 RX ORDER — FLUTICASONE PROPIONATE 50 MCG
2 SPRAY, SUSPENSION (ML) NASAL AS NEEDED
Qty: 9.9 ML | Refills: 0 | Status: SHIPPED | OUTPATIENT
Start: 2025-04-22

## 2025-05-07 ENCOUNTER — TELEMEDICINE (OUTPATIENT)
Dept: BEHAVIORAL/MENTAL HEALTH CLINIC | Facility: CLINIC | Age: 50
End: 2025-05-07
Payer: COMMERCIAL

## 2025-05-07 DIAGNOSIS — F41.1 GAD (GENERALIZED ANXIETY DISORDER): Primary | ICD-10-CM

## 2025-05-07 DIAGNOSIS — F32.A DEPRESSIVE DISORDER: ICD-10-CM

## 2025-05-07 DIAGNOSIS — F90.9 ATTENTION DEFICIT HYPERACTIVITY DISORDER (ADHD), UNSPECIFIED ADHD TYPE: ICD-10-CM

## 2025-05-07 PROCEDURE — 90832 PSYTX W PT 30 MINUTES: CPT | Performed by: BEHAVIOR ANALYST
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
## 2025-05-13 ENCOUNTER — TELEPHONE (OUTPATIENT)
Age: 50
End: 2025-05-13

## 2025-05-13 NOTE — TELEPHONE ENCOUNTER
Patient called, stated she wont be able to make it for her appointment with [facility] tomorrow 5/14 at 9am. Stated if she can be reschedule to any time in the afternoon tomorrow or on Thursday 5/15. Requested a call back.     Please advise, thank you.

## 2025-05-15 ENCOUNTER — TELEMEDICINE (OUTPATIENT)
Dept: BEHAVIORAL/MENTAL HEALTH CLINIC | Facility: CLINIC | Age: 50
End: 2025-05-15
Payer: COMMERCIAL

## 2025-05-15 DIAGNOSIS — F41.1 GAD (GENERALIZED ANXIETY DISORDER): Primary | ICD-10-CM

## 2025-05-15 DIAGNOSIS — F90.9 ATTENTION DEFICIT HYPERACTIVITY DISORDER (ADHD), UNSPECIFIED ADHD TYPE: ICD-10-CM

## 2025-05-15 DIAGNOSIS — F32.A DEPRESSIVE DISORDER: ICD-10-CM

## 2025-05-15 PROCEDURE — 90837 PSYTX W PT 60 MINUTES: CPT | Performed by: BEHAVIOR ANALYST
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
## 2025-05-16 ENCOUNTER — TELEPHONE (OUTPATIENT)
Age: 50
End: 2025-05-16

## 2025-05-16 DIAGNOSIS — R23.2 HOT FLASHES: Primary | ICD-10-CM

## 2025-05-16 RX ORDER — ESTRADIOL 0.1 MG/D
1 FILM, EXTENDED RELEASE TRANSDERMAL 2 TIMES WEEKLY
Qty: 8 PATCH | Refills: 6 | Status: SHIPPED | OUTPATIENT
Start: 2025-05-19 | End: 2025-05-22 | Stop reason: SDUPTHER

## 2025-05-16 NOTE — TELEPHONE ENCOUNTER
Pt called in with concerns for her estradiol patch. States she was prescribed 0.05 mg and then 0.075 mg, did not feel either were helping her. States she has been having trouble sleeping. Yesterday, she used two of her 0.05 mg patches and slept great. She is wondering if she can have a script for 0.1 mg patches sent to [facility] in [place]. States she is supposed to change the patch on Sunday and she is going out of town on Monday. Hoping to have the script updated before the weekend. Advised will review.

## 2025-05-16 NOTE — TELEPHONE ENCOUNTER
I did call higher dose contraception patch to the pharmacy but also let her take magnesium L-threonate at bedtime to help her sleep and avoid caffeine after 12 noon

## 2025-05-22 ENCOUNTER — TELEPHONE (OUTPATIENT)
Age: 50
End: 2025-05-22

## 2025-05-22 DIAGNOSIS — R23.2 HOT FLASHES: ICD-10-CM

## 2025-05-22 RX ORDER — ESTRADIOL 0.1 MG/D
1 FILM, EXTENDED RELEASE TRANSDERMAL 2 TIMES WEEKLY
Qty: 8 PATCH | Refills: 6 | Status: SHIPPED | OUTPATIENT
Start: 2025-05-22 | End: 2025-05-23 | Stop reason: SDUPTHER

## 2025-05-22 NOTE — TELEPHONE ENCOUNTER
Spoke with patient who is requesting estradiol patch be resent to updated pharmacy [facility].  [facility] will not fill less than 90 day supply and patient is currently out of patches.

## 2025-05-23 DIAGNOSIS — R23.2 HOT FLASHES: ICD-10-CM

## 2025-05-23 RX ORDER — ESTRADIOL 0.1 MG/D
1 FILM, EXTENDED RELEASE TRANSDERMAL 2 TIMES WEEKLY
Qty: 8 PATCH | Refills: 6 | Status: SHIPPED | OUTPATIENT
Start: 2025-05-26

## 2025-05-23 NOTE — TELEPHONE ENCOUNTER
I did resend the patch to the pharmacy in chart please make sure medication sent to the correct pharmacy as there is no clear pharmacy on the messages from the answering service

## 2025-05-25 ENCOUNTER — OFFICE VISIT (OUTPATIENT)
Dept: URGENT CARE | Facility: CLINIC | Age: 50
End: 2025-05-25
Payer: COMMERCIAL

## 2025-05-25 ENCOUNTER — APPOINTMENT (OUTPATIENT)
Dept: RADIOLOGY | Facility: CLINIC | Age: 50
End: 2025-05-25
Attending: PHYSICIAN ASSISTANT
Payer: COMMERCIAL

## 2025-05-25 VITALS
SYSTOLIC BLOOD PRESSURE: 120 MMHG | OXYGEN SATURATION: 99 % | DIASTOLIC BLOOD PRESSURE: 80 MMHG | TEMPERATURE: 97.9 F | HEART RATE: 94 BPM | RESPIRATION RATE: 18 BRPM

## 2025-05-25 DIAGNOSIS — J20.9 ACUTE BRONCHITIS, UNSPECIFIED ORGANISM: ICD-10-CM

## 2025-05-25 DIAGNOSIS — J20.9 ACUTE BRONCHITIS, UNSPECIFIED ORGANISM: Primary | ICD-10-CM

## 2025-05-25 PROCEDURE — G0382 LEV 3 HOSP TYPE B ED VISIT: HCPCS | Performed by: PHYSICIAN ASSISTANT

## 2025-05-25 PROCEDURE — 99283 EMERGENCY DEPT VISIT LOW MDM: CPT | Performed by: PHYSICIAN ASSISTANT

## 2025-05-25 PROCEDURE — 71046 X-RAY EXAM CHEST 2 VIEWS: CPT

## 2025-05-25 RX ORDER — AZITHROMYCIN 250 MG/1
TABLET, FILM COATED ORAL
Qty: 6 TABLET | Refills: 0 | Status: SHIPPED | OUTPATIENT
Start: 2025-05-25 | End: 2025-05-29

## 2025-05-25 RX ORDER — PREDNISONE 10 MG/1
TABLET ORAL
Qty: 26 TABLET | Refills: 0 | Status: SHIPPED | OUTPATIENT
Start: 2025-05-25
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
## 2025-05-27 ENCOUNTER — TELEMEDICINE (OUTPATIENT)
Dept: INTERNAL MEDICINE CLINIC | Facility: CLINIC | Age: 50
End: 2025-05-27
Payer: COMMERCIAL

## 2025-05-27 DIAGNOSIS — I10 ESSENTIAL HYPERTENSION: ICD-10-CM

## 2025-05-27 DIAGNOSIS — R91.1 INCIDENTAL LUNG NODULE, > 3MM AND < 8MM: Primary | ICD-10-CM

## 2025-05-27 PROCEDURE — 99213 OFFICE O/P EST LOW 20 MIN: CPT | Performed by: INTERNAL MEDICINE

## 2025-05-27 NOTE — ASSESSMENT & PLAN NOTE
At home blood pressure reading elevated today at 133/96.  Demonstrated proper way to check blood pressure at home.  Encouraged her to check her blood pressure once a day over the next 2 to 4 weeks and then send in her results via [employer].

## 2025-05-27 NOTE — PATIENT INSTRUCTIONS
-Dr. Bebe Gibson, Gynecology     -When checking your blood pressure please make sure you are seated with your feet flat on the ground for 5 to 10 minutes.  Rest your arm on a table at roughly heart level with your palm facing up to the laury before taking a reading.

## 2025-05-27 NOTE — ASSESSMENT & PLAN NOTE
7 mm lung nodule found on CT scan from 2/2.  A Follow-up 6-month CT chest for reevaluation of her nodule has been ordered and should be performed in August.  Orders:  •  CT lung nodule follow-up; Future

## 2025-05-27 NOTE — PROGRESS NOTES
Virtual Regular Visit  Name: Paula Rossi      : 1975      MRN: [id number]  Encounter Provider: Brad Orta MD  Encounter Date: 2025   [facility]: [facility]  :  Assessment & Plan  Incidental lung nodule, > 3mm and < 8mm  7 mm lung nodule found on CT scan from .  A Follow-up 6-month CT chest for reevaluation of her nodule has been ordered and should be performed in August.  Orders:  •  CT lung nodule follow-up; Future    Essential hypertension  At home blood pressure reading elevated today at 133/96.  Demonstrated proper way to check blood pressure at home.  Encouraged her to check her blood pressure once a day over the next 2 to 4 weeks and then send in her results via [employer].                History of Present Illness     HPI  Patient presents today as a telemedicine visit.  Today she reports her main concern is follow-up of the incidental lung nodule that was found on her CT scan on .  She has a history of cigarette use but quit several years ago.    She also reports when checking her blood pressure at home that her diastolic reading tends to be elevated.  She reports her last blood pressure check at an urgent care was normal.  The documented reading was 120/80.  Previously she called in complaining of lower extremity swelling at this time it was discovered that she was taking 2 tablets of amlodipine.  She has since cut back to 1 a day and the swelling has resolved.      Review of Systems  All other systems negative except for pertinent findings noted in HPI.       Objective   There were no vitals taken for this visit.    Physical Exam  General: No acute distress  HEENT: NCAT  Pulmonary: No respiratory distress  Psychiatric: Normal mood and affect     Administrative Statements   Encounter provider Brad Orta MD    The Patient is located at Home and in the following state in which I hold an active license [place].    The patient was identified  by name and date of birth. Paula Rossi was informed that this is a telemedicine visit and that the visit is being conducted through the Epic Embedded platform. She agrees to proceed..  My office door was closed. No one else was in the room.  She acknowledged consent and understanding of privacy and security of the video platform. The patient has agreed to participate and understands they can discontinue the visit at any time.    I have spent a total time of 10 minutes in caring for this patient on the day of the visit/encounter including Documenting in the medical record, Reviewing/placing orders in the medical record (including tests, medications, and/or procedures), and Obtaining or reviewing history  , not including the time spent for establishing the audio/video connection.

## 2025-05-29 ENCOUNTER — TELEPHONE (OUTPATIENT)
Age: 50
End: 2025-05-29

## 2025-06-02 DIAGNOSIS — R23.2 HOT FLASHES: ICD-10-CM

## 2025-06-02 RX ORDER — ESTRADIOL 0.1 MG/D
1 FILM, EXTENDED RELEASE TRANSDERMAL 2 TIMES WEEKLY
Qty: 24 PATCH | Refills: 2 | Status: SHIPPED | OUTPATIENT
Start: 2025-06-02

## 2025-06-04 ENCOUNTER — TELEMEDICINE (OUTPATIENT)
Dept: BEHAVIORAL/MENTAL HEALTH CLINIC | Facility: CLINIC | Age: 50
End: 2025-06-04
Payer: COMMERCIAL

## 2025-06-04 DIAGNOSIS — F32.A DEPRESSIVE DISORDER: ICD-10-CM

## 2025-06-04 DIAGNOSIS — F90.9 ATTENTION DEFICIT HYPERACTIVITY DISORDER (ADHD), UNSPECIFIED ADHD TYPE: ICD-10-CM

## 2025-06-04 DIAGNOSIS — F41.1 GAD (GENERALIZED ANXIETY DISORDER): Primary | ICD-10-CM

## 2025-06-04 PROCEDURE — 90834 PSYTX W PT 45 MINUTES: CPT | Performed by: BEHAVIOR ANALYST
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
## 2025-06-18 ENCOUNTER — TELEMEDICINE (OUTPATIENT)
Dept: BEHAVIORAL/MENTAL HEALTH CLINIC | Facility: CLINIC | Age: 50
End: 2025-06-18
Payer: COMMERCIAL

## 2025-06-18 DIAGNOSIS — F41.1 GAD (GENERALIZED ANXIETY DISORDER): Primary | ICD-10-CM

## 2025-06-18 DIAGNOSIS — F90.9 ATTENTION DEFICIT HYPERACTIVITY DISORDER (ADHD), UNSPECIFIED ADHD TYPE: ICD-10-CM

## 2025-06-18 DIAGNOSIS — F32.A DEPRESSIVE DISORDER: ICD-10-CM

## 2025-06-18 PROCEDURE — 90837 PSYTX W PT 60 MINUTES: CPT | Performed by: BEHAVIOR ANALYST
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
## 2025-06-19 ENCOUNTER — PROCEDURE VISIT (OUTPATIENT)
Dept: OBGYN CLINIC | Facility: CLINIC | Age: 50
End: 2025-06-19
Payer: COMMERCIAL

## 2025-06-19 VITALS
BODY MASS INDEX: 28.34 KG/M2 | WEIGHT: 166 LBS | DIASTOLIC BLOOD PRESSURE: 100 MMHG | HEIGHT: 64 IN | SYSTOLIC BLOOD PRESSURE: 160 MMHG

## 2025-06-19 DIAGNOSIS — R87.89 HIGH RISK HUMAN PAPILLOMAVIRUS (HPV) DNA TEST POSITIVE: ICD-10-CM

## 2025-06-19 DIAGNOSIS — R23.2 HOT FLASHES: Primary | ICD-10-CM

## 2025-06-19 PROCEDURE — 57420 EXAM OF VAGINA W/SCOPE: CPT | Performed by: OBSTETRICS & GYNECOLOGY

## 2025-06-19 RX ORDER — ESTRADIOL 1 MG/G
1 GEL TOPICAL DAILY
Qty: 30 G | Refills: 3 | Status: SHIPPED | OUTPATIENT
Start: 2025-06-19

## 2025-06-19 NOTE — PROGRESS NOTES
"Assessment/Plan:     Diagnoses and all orders for this visit:    Hot flashes  -     Estradiol 1 MG/GM GEL; Place 1 Application on the skin in the morning    High risk human papillomavirus (HPV) DNA test positive        49-year-old female  Had hysterectomy with BSO secondary to abnormal Pap smear and PCOS  Stress urinary incontinence had sling procedure in the past  Mild BERLIN  Chronic hypertension  Depression stable  Diabetes  HPV 18 positive  Plan   Colposcopy no biopsy needed  Diet/exercise  Calcium/vitamin D  Patient has no control of her pelvic floor muscle recommend physical therapy referral  Continue estrogen replacement therapy try Divigel   mammogram  Colonoscopy up-to-date  Return to office for annual exam  Subjective:      Patient ID: Paula Rossi is a 50 y.o. female.    HPI  Patient seen evaluated presented office today for colposcopy secondary to HPV 18 positive  Procedure explained and discussed with patient all patient questions answered and patient was satisfied    Patient also was taking estrogen patch desires different method consider EstroGel risk-benefit side effect reviewed and discussed with patient all patient questions answered and patient was satisfied      The following portions of the patient's history were reviewed and updated as appropriate: allergies, current medications, past family history, past medical history, past social history, past surgical history and problem list.    Review of Systems      Objective:      /100 (BP Location: Left arm, Patient Position: Sitting, Cuff Size: Adult)   Ht 5' 4\" (1.626 m)   Wt 75.3 kg (166 lb)   BMI 28.49 kg/m²          Physical Exam    Colposcopy    Date/Time: 6/19/2025 3:00 PM    Performed by: Abraham Bishop MD  Authorized by: Abraham Bishop MD    Verbal consent obtained?: Yes    Risks and benefits: Risks, benefits and alternatives were discussed    Consent given by:  Patient  Time Out:     Time out: Immediately prior to the procedure a " time out was called    Patient states understanding of procedure being performed: Yes    Patient's understanding of procedure matches consent: Yes    Procedure consent matches procedure scheduled: Yes    Relevant documents present and verified: Yes    Patient identity confirmed:  Verbally with patient  Pre-procedure:     Prepped with: acetic acid    Indication:     Indications: HPV 18.  Procedure:     Procedure: Colposcopy of vagina only      Under satisfactory analgesia the patient was prepped and draped in the dorsal lithotomy position: yes      [place] speculum was placed in the vagina: yes    Comments:      Acetic acid apply no abnormality noted on the vaginal mucosa or vaginal cuff no biopsy obtained

## 2025-06-19 NOTE — PROGRESS NOTES
Pre-charting for Appointment      Reason for being seen today: Colposcopy    Any current testing/imaging done prior to exam today:

## 2025-06-25 ENCOUNTER — TELEMEDICINE (OUTPATIENT)
Dept: BEHAVIORAL/MENTAL HEALTH CLINIC | Facility: CLINIC | Age: 50
End: 2025-06-25
Payer: COMMERCIAL

## 2025-06-25 DIAGNOSIS — F90.9 ATTENTION DEFICIT HYPERACTIVITY DISORDER (ADHD), UNSPECIFIED ADHD TYPE: ICD-10-CM

## 2025-06-25 DIAGNOSIS — F41.1 GAD (GENERALIZED ANXIETY DISORDER): Primary | ICD-10-CM

## 2025-06-25 DIAGNOSIS — F32.A DEPRESSIVE DISORDER: ICD-10-CM

## 2025-06-25 PROCEDURE — 90837 PSYTX W PT 60 MINUTES: CPT | Performed by: BEHAVIOR ANALYST
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
## 2025-07-09 ENCOUNTER — TELEMEDICINE (OUTPATIENT)
Dept: BEHAVIORAL/MENTAL HEALTH CLINIC | Facility: CLINIC | Age: 50
End: 2025-07-09
Payer: COMMERCIAL

## 2025-07-09 DIAGNOSIS — F90.9 ATTENTION DEFICIT HYPERACTIVITY DISORDER (ADHD), UNSPECIFIED ADHD TYPE: ICD-10-CM

## 2025-07-09 DIAGNOSIS — F41.1 GAD (GENERALIZED ANXIETY DISORDER): Primary | ICD-10-CM

## 2025-07-09 PROCEDURE — 90837 PSYTX W PT 60 MINUTES: CPT | Performed by: BEHAVIOR ANALYST
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
## 2025-07-09 NOTE — BH TREATMENT PLAN
Outpatient Behavioral Health Psychotherapy Treatment Plan    Paula Rossi  1975     Date of Initial Psychotherapy Assessment: 1/15/25   Date of Current Treatment Plan: 07/09/25  Treatment Plan Target Date: 12/9/25  Treatment Plan Expiration Date: 1/9/26    Diagnosis:   1. ROMULO (generalized anxiety disorder)        2. Attention deficit hyperactivity disorder (ADHD), unspecified ADHD type            Area(s) of Need: trauma, anxiety, depression    Long Term Goal 1 (in the client's own words): I want to be more in tune with my emotions and manage them well     Stage of Change: Action    Target Date for completion: 12/9/25     Anticipated therapeutic modalities: client centered, CBT, ACT     People identified to complete this goal: myself and therapist      Objective 1: (identify the means of measuring success in meeting the objective): Paula will learn 3 strategies to communicate her emotions and needs with others       Objective 2: (identify the means of measuring success in meeting the objective): Paula will identify 3 triggers for her emotional responses       Long Term Goal 2 (in the client's own words): I want to be less anxious and more mindful     Stage of Change: Action    Target Date for completion: 12/9/25     Anticipated therapeutic modalities: client centered, CBT, mindfulness     People identified to complete this goal: myself and my therapist      Objective 1: (identify the means of measuring success in meeting the objective): Paula will learn 2 strategies to be more mindful and improve health      Objective 2: (identify the means of measuring success in meeting the objective): Paula will identify 1 anxious/negative thought per session and work to reframe or challenge that thought.         I am currently under the care of a [facility] psychiatric provider: no    My [facility] psychiatric provider is: n/a    I am currently taking psychiatric medications: Yes, as prescribed    I feel that I will be  ready for discharge from mental health care when I reach the following (measurable goal/objective): when I am able to identify and process my emotions and when I am able to love myself and not let others influence things I do. I want to understand myself and what I want out of life.     For children and adults who have a legal guardian:   Has there been any change to custody orders and/or guardianship status? N/A. If yes, attach updated documentation.    I have reviewed my Crisis Plan and have been offered a copy of this plan    Behavioral Health Treatment Plan [facility]: Diagnosis and Treatment Plan explained to Paula Rossi acknowledges an understanding of their diagnosis. Paula Rossi agrees to this treatment plan.    I have been offered a copy of this Treatment Plan. yes

## 2025-07-15 DIAGNOSIS — I10 ESSENTIAL HYPERTENSION: ICD-10-CM

## 2025-07-16 ENCOUNTER — TELEMEDICINE (OUTPATIENT)
Dept: BEHAVIORAL/MENTAL HEALTH CLINIC | Facility: CLINIC | Age: 50
End: 2025-07-16
Payer: COMMERCIAL

## 2025-07-16 DIAGNOSIS — F41.1 GAD (GENERALIZED ANXIETY DISORDER): Primary | ICD-10-CM

## 2025-07-16 DIAGNOSIS — F90.9 ATTENTION DEFICIT HYPERACTIVITY DISORDER (ADHD), UNSPECIFIED ADHD TYPE: ICD-10-CM

## 2025-07-16 DIAGNOSIS — F32.A DEPRESSIVE DISORDER: ICD-10-CM

## 2025-07-16 PROCEDURE — 90837 PSYTX W PT 60 MINUTES: CPT | Performed by: BEHAVIOR ANALYST
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
## 2025-07-17 RX ORDER — AMLODIPINE BESYLATE 10 MG/1
10 TABLET ORAL DAILY
Qty: 90 TABLET | Refills: 1 | Status: SHIPPED | OUTPATIENT
Start: 2025-07-17

## 2025-07-19 ENCOUNTER — OFFICE VISIT (OUTPATIENT)
Dept: URGENT CARE | Facility: CLINIC | Age: 50
End: 2025-07-19
Payer: COMMERCIAL

## 2025-07-19 VITALS
TEMPERATURE: 97.7 F | RESPIRATION RATE: 16 BRPM | BODY MASS INDEX: 28.34 KG/M2 | HEART RATE: 88 BPM | SYSTOLIC BLOOD PRESSURE: 153 MMHG | OXYGEN SATURATION: 98 % | HEIGHT: 64 IN | DIASTOLIC BLOOD PRESSURE: 64 MMHG | WEIGHT: 166 LBS

## 2025-07-19 DIAGNOSIS — H61.22 IMPACTED CERUMEN OF LEFT EAR: Primary | ICD-10-CM

## 2025-07-19 PROCEDURE — G0382 LEV 3 HOSP TYPE B ED VISIT: HCPCS | Performed by: PHYSICIAN ASSISTANT

## 2025-07-19 PROCEDURE — 99283 EMERGENCY DEPT VISIT LOW MDM: CPT | Performed by: PHYSICIAN ASSISTANT

## 2025-07-19 PROCEDURE — 69209 REMOVE IMPACTED EAR WAX UNI: CPT | Performed by: PHYSICIAN ASSISTANT
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
## 2025-07-23 ENCOUNTER — TELEMEDICINE (OUTPATIENT)
Dept: BEHAVIORAL/MENTAL HEALTH CLINIC | Facility: CLINIC | Age: 50
End: 2025-07-23
Payer: COMMERCIAL

## 2025-07-23 DIAGNOSIS — F41.1 GAD (GENERALIZED ANXIETY DISORDER): ICD-10-CM

## 2025-07-23 DIAGNOSIS — F32.A DEPRESSIVE DISORDER: ICD-10-CM

## 2025-07-23 DIAGNOSIS — F90.9 ATTENTION DEFICIT HYPERACTIVITY DISORDER (ADHD), UNSPECIFIED ADHD TYPE: Primary | ICD-10-CM

## 2025-07-23 PROCEDURE — 90837 PSYTX W PT 60 MINUTES: CPT | Performed by: BEHAVIOR ANALYST
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
## 2025-07-30 ENCOUNTER — TELEMEDICINE (OUTPATIENT)
Dept: BEHAVIORAL/MENTAL HEALTH CLINIC | Facility: CLINIC | Age: 50
End: 2025-07-30
Payer: COMMERCIAL

## 2025-07-30 DIAGNOSIS — F32.A DEPRESSIVE DISORDER: Primary | ICD-10-CM

## 2025-07-30 DIAGNOSIS — F41.1 GAD (GENERALIZED ANXIETY DISORDER): ICD-10-CM

## 2025-07-30 DIAGNOSIS — F90.9 ATTENTION DEFICIT HYPERACTIVITY DISORDER (ADHD), UNSPECIFIED ADHD TYPE: ICD-10-CM

## 2025-07-30 PROCEDURE — 90834 PSYTX W PT 45 MINUTES: CPT | Performed by: BEHAVIOR ANALYST

## 2025-08-20 ENCOUNTER — TELEMEDICINE (OUTPATIENT)
Dept: BEHAVIORAL/MENTAL HEALTH CLINIC | Facility: CLINIC | Age: 50
End: 2025-08-20
Payer: COMMERCIAL

## 2025-08-20 DIAGNOSIS — F41.1 GAD (GENERALIZED ANXIETY DISORDER): Primary | ICD-10-CM

## 2025-08-20 DIAGNOSIS — F32.A DEPRESSIVE DISORDER: ICD-10-CM

## 2025-08-20 PROCEDURE — 90834 PSYTX W PT 45 MINUTES: CPT | Performed by: BEHAVIOR ANALYST

## (undated) DEVICE — BETHLEHEM UNIVERSAL  MIONR EXT: Brand: CARDINAL HEALTH

## (undated) DEVICE — SYRINGE 10ML LL

## (undated) DEVICE — GLOVE SRG BIOGEL 8

## (undated) DEVICE — SPLINT ORTHO-GLASS 4IN X 15FT

## (undated) DEVICE — SUT PROLENE 3-0 PS1 18 IN 8663G

## (undated) DEVICE — CUFF TOURNIQUET 18 X 4 IN QUICK CONNECT DISP 1 BLADDER

## (undated) DEVICE — GLOVE INDICATOR PI UNDERGLOVE SZ 8 BLUE

## (undated) DEVICE — DRAPE C-ARMOUR

## (undated) DEVICE — KERLIX BANDAGE ROLL: Brand: KERLIX

## (undated) DEVICE — BLADE SAW 11 X 40MM LAPIPLASTY LINVATEC

## (undated) DEVICE — SUT VICRYL 3-0 PS-2 27 IN J427H

## (undated) DEVICE — 10FR FRAZIER SUCTION HANDLE: Brand: CARDINAL HEALTH

## (undated) DEVICE — SINGLE PORT MANIFOLD: Brand: NEPTUNE 2

## (undated) DEVICE — PLUMEPEN PRO 10FT

## (undated) DEVICE — DRAPE C-ARM X-RAY

## (undated) DEVICE — PADDING CAST 6IN COTTON STRL

## (undated) DEVICE — CHLORAPREP HI-LITE 26ML ORANGE

## (undated) DEVICE — STOCKINETTE REGULAR

## (undated) DEVICE — SCD SEQUENTIAL COMPRESSION COMFORT SLEEVE MEDIUM KNEE LENGTH: Brand: KENDALL SCD

## (undated) DEVICE — BLADE SAGITTAL 25.6 X 9.5MM

## (undated) DEVICE — GAUZE SPONGES,16 PLY: Brand: CURITY

## (undated) DEVICE — 2000CC GUARDIAN II: Brand: GUARDIAN

## (undated) DEVICE — PADDING CAST 4 IN  COTTON STRL

## (undated) DEVICE — SUT ETHILON 4-0 PS-2 18 IN 1667H

## (undated) DEVICE — NEEDLE 25G X 1 1/2

## (undated) DEVICE — ACE WRAP 4 IN UNSTERILE

## (undated) DEVICE — SPONGE LAP 18 X 18 IN STRL RFD

## (undated) DEVICE — COBAN 4 IN STERILE

## (undated) DEVICE — 3M™ STERI-STRIP™ REINFORCED ADHESIVE SKIN CLOSURES, R1547, 1/2 IN X 4 IN (12 MM X 100 MM), 6 STRIPS/ENVELOPE: Brand: 3M™ STERI-STRIP™

## (undated) DEVICE — GLOVE SRG BIOGEL 7

## (undated) DEVICE — GLOVE INDICATOR PI UNDERGLOVE SZ 7 BLUE

## (undated) DEVICE — INTENDED FOR TISSUE SEPARATION, AND OTHER PROCEDURES THAT REQUIRE A SHARP SURGICAL BLADE TO PUNCTURE OR CUT.: Brand: BARD-PARKER ® CARBON RIB-BACK BLADES

## (undated) DEVICE — GLOVE INDICATOR PI UNDERGLOVE SZ 7.5 BLUE

## (undated) DEVICE — SPLINT ORTHO-GLASS 3IN X 15FT

## (undated) DEVICE — NEEDLE 18 G X 1 1/2

## (undated) DEVICE — CAST PADDING 4 IN SYNTHETIC NON-STRL

## (undated) DEVICE — OCCLUSIVE GAUZE STRIP,3% BISMUTH TRIBROMOPHENATE IN PETROLATUM BLEND: Brand: XEROFORM

## (undated) DEVICE — GLOVE SRG BIOGEL 7.5

## (undated) DEVICE — SUT VICRYL 3-0 SH 27 IN J416H

## (undated) DEVICE — SUT VICRYL 4-0 PS-2 27 IN J426H

## (undated) DEVICE — GLOVE SRG BIOGEL ORTHOPEDIC 7.5

## (undated) DEVICE — PAD CAST 4 IN COTTON NON STERILE

## (undated) DEVICE — ACE WRAP 6 IN UNSTERILE

## (undated) DEVICE — CURITY STRETCH BANDAGE: Brand: CURITY

## (undated) DEVICE — CURITY NON-ADHERENT STRIPS: Brand: CURITY

## (undated) DEVICE — ASTOUND STANDARD SURGICAL GOWN, XL: Brand: CONVERTORS

## (undated) DEVICE — SUT VICRYL 3-0 REEL 54 IN J285G

## (undated) DEVICE — BANDAGE, ESMARK LF STR 4"X9'(20/CS): Brand: CYPRESS